# Patient Record
Sex: FEMALE | Race: WHITE | Employment: FULL TIME | URBAN - METROPOLITAN AREA
[De-identification: names, ages, dates, MRNs, and addresses within clinical notes are randomized per-mention and may not be internally consistent; named-entity substitution may affect disease eponyms.]

---

## 2018-01-09 ENCOUNTER — HOSPITAL ENCOUNTER (OUTPATIENT)
Dept: MAMMOGRAPHY | Age: 63
Discharge: HOME | End: 2018-01-09
Admitting: ADVANCED PRACTICE MIDWIFE

## 2018-01-09 DIAGNOSIS — Z12.39 BREAST CANCER SCREENING: ICD-10-CM

## 2018-03-20 ENCOUNTER — PAT TELEPHONE (OUTPATIENT)
Dept: PREADMISSION TESTING | Age: 63
End: 2018-03-20

## 2018-03-20 VITALS — BODY MASS INDEX: 29.88 KG/M2 | HEIGHT: 64 IN | WEIGHT: 175 LBS

## 2018-03-27 ENCOUNTER — HOSPITAL ENCOUNTER (OUTPATIENT)
Dept: ENDOSCOPY | Age: 63
Discharge: HOME | End: 2018-03-27
Attending: INTERNAL MEDICINE | Admitting: INTERNAL MEDICINE

## 2018-03-27 VITALS
SYSTOLIC BLOOD PRESSURE: 138 MMHG | HEART RATE: 60 BPM | RESPIRATION RATE: 16 BRPM | DIASTOLIC BLOOD PRESSURE: 87 MMHG | HEIGHT: 64 IN | BODY MASS INDEX: 29.88 KG/M2 | OXYGEN SATURATION: 97 % | TEMPERATURE: 98.6 F | WEIGHT: 175 LBS

## 2018-03-27 DIAGNOSIS — Z12.11 ENCOUNTER FOR SCREENING FOR MALIGNANT NEOPLASM OF COLON: ICD-10-CM

## 2018-03-27 DIAGNOSIS — Z12.11 SCREENING FOR COLON CANCER: ICD-10-CM

## 2018-03-27 RX ORDER — SODIUM CHLORIDE 0.9 % (FLUSH) 0.9 %
10 SYRINGE (ML) INJECTION PRN
Status: DISCONTINUED | OUTPATIENT
Start: 2018-03-27 | End: 2018-03-28 | Stop reason: HOSPADM

## 2018-03-27 RX ORDER — SODIUM CHLORIDE 0.9 % (FLUSH) 0.9 %
10 SYRINGE (ML) INJECTION EVERY 12 HOURS SCHEDULED
Status: DISCONTINUED | OUTPATIENT
Start: 2018-03-27 | End: 2018-03-28 | Stop reason: HOSPADM

## 2018-03-27 RX ORDER — SODIUM CHLORIDE 9 MG/ML
INJECTION, SOLUTION INTRAVENOUS CONTINUOUS
Status: DISCONTINUED | OUTPATIENT
Start: 2018-03-27 | End: 2018-03-28 | Stop reason: HOSPADM

## 2018-03-27 RX ADMIN — SODIUM CHLORIDE: 9 INJECTION, SOLUTION INTRAVENOUS at 12:04

## 2018-03-27 ASSESSMENT — PAIN - FUNCTIONAL ASSESSMENT: PAIN_FUNCTIONAL_ASSESSMENT: 0-10

## 2018-03-27 NOTE — ANESTHESIA POST-OP
Postoperative Anesthesia Note    Name:    Reina Go  MRN:      [id number]    Patient Vitals for the past 12 hrs:   BP Temp Temp src Pulse Resp SpO2 Height Weight   03/27/18 1310 138/87 - - 60 16 - - -   03/27/18 1300 129/85 - - 66 16 - - -   03/27/18 1252 (!) 106/54 98.6 °F (37 °C) Temporal 65 16 97 % - -   03/27/18 1201 (!) 147/79 97.5 °F (36.4 °C) Temporal 65 18 100 % 5' 4\" (1.626 m) 175 lb (79.4 kg)        LABS:    CBC  No results found for: WBC, HGB, HCT, PLT  RENAL  No results found for: NA, K, CL, CO2, BUN, CREATININE, GLUCOSE  COAGS  No results found for: PROTIME, INR, APTT    Intake & Output: In: 500 [I.V.:500]  Out: -     Nausea & Vomiting:  No    Level of Consciousness:  Awake    Pain Assessment:  Adequate analgesia    Anesthesia Complications:  No apparent anesthetic complications    SUMMARY      Vital signs stable  OK to discharge from Stage I post anesthesia care.   Care transferred from Anesthesiology department on discharge from perioperative area

## 2018-03-27 NOTE — OP NOTE
descending, sigmoid colon, and rectum. Careful circumferential examination of the mucosa in these areas demonstrated a 4mm transverse colon polyp. This was completely removed with biopsy polypectomy. There was no residual polyp or significant bleeding at the polypectomy site. The scope was then withdrawn into the rectum and retroflexed. The retroflexed view of the anal verge and rectum demonstrates no abnormalities. The scope was straightened, the colon was decompressed and the scope was withdrawn from the patient. The patient tolerated the procedure well and was taken to the PACU in good condition. Estimated blood loss: None    Impression:    1) 4mm transverse colon polyp. This was completely removed with biopsy polypectomy. There was no residual polyp or significant bleeding at the polypectomy site. Recommendations:    1) The patient had biopsies taken today. The patient should call for results in 7 days if they have not heard from our office. Our number is [phone].  2) The patient will need a recall colonoscopy in 5 years if the polyp is adenomatous (which have a small chance of transitioning into colon cancer), or 10 years if the polyp is hyperplastic (which have no chance of transitioning to colon cancer).        [facility], [address] [facility]  3/27/2018  [phone]

## 2018-03-27 NOTE — PROGRESS NOTES
Discharge instructions discussed with pt. And her . Understanding verbalized. Dr. Oniel Mallory also here to talk with them.

## 2018-04-26 PROBLEM — Z12.11 SCREENING FOR COLON CANCER: Status: RESOLVED | Noted: 2018-03-27 | Resolved: 2018-04-26

## 2019-04-30 ENCOUNTER — HOSPITAL ENCOUNTER (EMERGENCY)
Age: 64
Discharge: HOME | End: 2019-05-01
Attending: EMERGENCY MEDICINE
Payer: COMMERCIAL

## 2019-04-30 ENCOUNTER — APPOINTMENT (OUTPATIENT)
Dept: GENERAL RADIOLOGY | Age: 64
End: 2019-04-30
Payer: COMMERCIAL

## 2019-04-30 DIAGNOSIS — R07.9 CHEST PAIN, UNSPECIFIED TYPE: Primary | ICD-10-CM

## 2019-04-30 LAB
ANION GAP SERPL CALCULATED.3IONS-SCNC: 15 MMOL/L (ref 3–16)
BASOPHILS ABSOLUTE: 0 K/UL (ref 0–0.2)
BASOPHILS RELATIVE PERCENT: 0.5 %
BUN BLDV-MCNC: 15 MG/DL (ref 7–20)
CALCIUM SERPL-MCNC: 9.5 MG/DL (ref 8.3–10.6)
CHLORIDE BLD-SCNC: 103 MMOL/L (ref 99–110)
CO2: 23 MMOL/L (ref 21–32)
CREAT SERPL-MCNC: 0.7 MG/DL (ref 0.6–1.2)
EOSINOPHILS ABSOLUTE: 0.3 K/UL (ref 0–0.6)
EOSINOPHILS RELATIVE PERCENT: 3.7 %
GFR AFRICAN AMERICAN: >60
GFR NON-AFRICAN AMERICAN: >60
GLUCOSE BLD-MCNC: 97 MG/DL (ref 70–99)
HCT VFR BLD CALC: 44.9 % (ref 36–48)
HEMOGLOBIN: 15.1 G/DL (ref 12–16)
LYMPHOCYTES ABSOLUTE: 2.8 K/UL (ref 1–5.1)
LYMPHOCYTES RELATIVE PERCENT: 41 %
MCH RBC QN AUTO: 30 PG (ref 26–34)
MCHC RBC AUTO-ENTMCNC: 33.6 G/DL (ref 31–36)
MCV RBC AUTO: 89.3 FL (ref 80–100)
MONOCYTES ABSOLUTE: 0.6 K/UL (ref 0–1.3)
MONOCYTES RELATIVE PERCENT: 8.8 %
NEUTROPHILS ABSOLUTE: 3.2 K/UL (ref 1.7–7.7)
NEUTROPHILS RELATIVE PERCENT: 46 %
PDW BLD-RTO: 14.8 % (ref 12.4–15.4)
PLATELET # BLD: 219 K/UL (ref 135–450)
PMV BLD AUTO: 7.8 FL (ref 5–10.5)
POTASSIUM REFLEX MAGNESIUM: 3.9 MMOL/L (ref 3.5–5.1)
PRO-BNP: 84 PG/ML (ref 0–124)
RBC # BLD: 5.03 M/UL (ref 4–5.2)
SODIUM BLD-SCNC: 141 MMOL/L (ref 136–145)
TROPONIN: <0.01 NG/ML
WBC # BLD: 6.9 K/UL (ref 4–11)

## 2019-04-30 PROCEDURE — 80048 BASIC METABOLIC PNL TOTAL CA: CPT

## 2019-04-30 PROCEDURE — 2580000003 HC RX 258: Performed by: PHYSICIAN ASSISTANT

## 2019-04-30 PROCEDURE — 93005 ELECTROCARDIOGRAM TRACING: CPT | Performed by: PHYSICIAN ASSISTANT

## 2019-04-30 PROCEDURE — 99285 EMERGENCY DEPT VISIT HI MDM: CPT

## 2019-04-30 PROCEDURE — 83880 ASSAY OF NATRIURETIC PEPTIDE: CPT

## 2019-04-30 PROCEDURE — 71046 X-RAY EXAM CHEST 2 VIEWS: CPT

## 2019-04-30 PROCEDURE — 84484 ASSAY OF TROPONIN QUANT: CPT

## 2019-04-30 PROCEDURE — 85025 COMPLETE CBC W/AUTO DIFF WBC: CPT

## 2019-04-30 PROCEDURE — 6370000000 HC RX 637 (ALT 250 FOR IP): Performed by: PHYSICIAN ASSISTANT

## 2019-04-30 RX ORDER — ASPIRIN 325 MG
325 TABLET ORAL DAILY
Status: DISCONTINUED | OUTPATIENT
Start: 2019-04-30 | End: 2019-05-01 | Stop reason: HOSPADM

## 2019-04-30 RX ORDER — SODIUM CHLORIDE 0.9 % (FLUSH) 0.9 %
10 SYRINGE (ML) INJECTION EVERY 12 HOURS SCHEDULED
Status: DISCONTINUED | OUTPATIENT
Start: 2019-04-30 | End: 2019-05-01 | Stop reason: HOSPADM

## 2019-04-30 RX ORDER — SODIUM CHLORIDE 0.9 % (FLUSH) 0.9 %
10 SYRINGE (ML) INJECTION PRN
Status: DISCONTINUED | OUTPATIENT
Start: 2019-04-30 | End: 2019-05-01 | Stop reason: HOSPADM

## 2019-04-30 RX ADMIN — ASPIRIN 325 MG ORAL TABLET 325 MG: 325 PILL ORAL at 21:57

## 2019-04-30 RX ADMIN — Medication 10 ML: at 22:13

## 2019-04-30 ASSESSMENT — PAIN SCALES - GENERAL: PAINLEVEL_OUTOF10: 6

## 2019-04-30 ASSESSMENT — ENCOUNTER SYMPTOMS
VOMITING: 0
WHEEZING: 0
SORE THROAT: 0
ABDOMINAL PAIN: 0
SHORTNESS OF BREATH: 0
COUGH: 0
NAUSEA: 0

## 2019-04-30 ASSESSMENT — PAIN DESCRIPTION - LOCATION: LOCATION: CHEST

## 2019-04-30 ASSESSMENT — PAIN DESCRIPTION - PAIN TYPE: TYPE: ACUTE PAIN;CHRONIC PAIN

## 2019-04-30 ASSESSMENT — HEART SCORE: ECG: 0

## 2019-04-30 NOTE — ED PROVIDER NOTES
ED Attending Attestation Note     Date of evaluation: 4/30/2019    This patient was seen by the advance practice provider. I have seen and examined the patient, agree with the workup, evaluation, management and diagnosis. The care plan has been discussed. I have reviewed the ECG and concur with the JEANNE's interpretation. My assessment reveals s1s2. Respiratory effort symmetrical.  Cardiac workup ordered.      Bonita Stokes MD  04/30/19 3759

## 2019-04-30 NOTE — ED PROVIDER NOTES
[address] [facility] ENCOUNTER          PHYSICIAN ASSISTANT NOTE       Date of evaluation: 4/30/2019    Chief Complaint     Chest Pain      History of Present Illness     Maria Del Carmen Miller is a 59 y.o. female presents today with acute onset chest pain. Patient states she woke this morning with some burning on her sternum, which progressive worsen or sharp pressure-like pain that radiated down towards her left breast.  The chest pain seemed to worsen when she went all long walk today. Denies any shortness of breath or chest pain. She denies any back pain or tearing back pain. She denies any abdominal pain. She denies any nausea or vomiting, sweating, presyncopal, or other symptoms. She denies any cardiac history. She denies any history of aortic pathology. She states she is more stress lately because her  just underwent open-heart surgery 2 weeks ago and is still recovering. She states she may have been treated for high blood pressure in the past but is not currently on medications for this, resents today at 184/121. Review of Systems     Review of Systems   Constitutional: Negative for chills and fever. HENT: Negative for sore throat. Eyes: Negative for visual disturbance. Respiratory: Negative for cough, shortness of breath and wheezing. Cardiovascular: Positive for chest pain. Negative for palpitations. Gastrointestinal: Negative for abdominal pain, nausea and vomiting. Musculoskeletal: Negative for gait problem. Skin: Negative for rash. Neurological: Negative for dizziness and headaches. Past Medical, Surgical, Family, and Social History     She has a past medical history of Seasonal allergies. She has a past surgical history that includes Tubal ligation; Breast surgery; Tonsillectomy; Foot surgery (Right); [place] tooth extraction; and Foot surgery (Left, 05/27/2015).   Her family history includes Diabetes in her father; Heart Disease in her father EKG    RADIOLOGY:  XR CHEST STANDARD (2 VW)   Final Result      Possible bronchitis      No lobar consolidation. NM Cardiac Stress Test Nuclear Imaging    (Results Pending)       LABS:   Results for orders placed or performed during the hospital encounter of 04/30/19   CBC Auto Differential   Result Value Ref Range    WBC 6.9 4.0 - 11.0 K/uL    RBC 5.03 4.00 - 5.20 M/uL    Hemoglobin 15.1 12.0 - 16.0 g/dL    Hematocrit 44.9 36.0 - 48.0 %    MCV 89.3 80.0 - 100.0 fL    MCH 30.0 26.0 - 34.0 pg    MCHC 33.6 31.0 - 36.0 g/dL    RDW 14.8 12.4 - 15.4 %    Platelets 115 704 - 804 K/uL    MPV 7.8 5.0 - 10.5 fL    Neutrophils % 46.0 %    Lymphocytes % 41.0 %    Monocytes % 8.8 %    Eosinophils % 3.7 %    Basophils % 0.5 %    Neutrophils # 3.2 1.7 - 7.7 K/uL    Lymphocytes # 2.8 1.0 - 5.1 K/uL    Monocytes # 0.6 0.0 - 1.3 K/uL    Eosinophils # 0.3 0.0 - 0.6 K/uL    Basophils # 0.0 0.0 - 0.2 K/uL   Basic Metabolic Panel w/ Reflex to MG   Result Value Ref Range    Sodium 141 136 - 145 mmol/L    Potassium reflex Magnesium 3.9 3.5 - 5.1 mmol/L    Chloride 103 99 - 110 mmol/L    CO2 23 21 - 32 mmol/L    Anion Gap 15 3 - 16    Glucose 97 70 - 99 mg/dL    BUN 15 7 - 20 mg/dL    CREATININE 0.7 0.6 - 1.2 mg/dL    GFR Non-African American >60 >60    GFR African American >60 >60    Calcium 9.5 8.3 - 10.6 mg/dL   Troponin   Result Value Ref Range    Troponin <0.01 <0.01 ng/mL   Brain Natriuretic Peptide   Result Value Ref Range    Pro-BNP 84 0 - 124 pg/mL       ED BEDSIDE ULTRASOUND:      RECENT VITALS:  BP: (!) 184/121, Temp: 98.4 °F (36.9 °C), Pulse: 64, Resp: 11, SpO2: 99 %     Procedures         ED Course     Nursing Notes, Past Medical Hx, Past Surgical Hx, Social Hx, Allergies, and Family Hx were reviewed.     The patient was given the following medications:  Orders Placed This Encounter   Medications    sodium chloride flush 0.9 % injection 10 mL    sodium chloride flush 0.9 % injection 10 mL    aspirin tablet 325 mg

## 2019-05-01 VITALS
DIASTOLIC BLOOD PRESSURE: 68 MMHG | HEART RATE: 69 BPM | OXYGEN SATURATION: 99 % | TEMPERATURE: 98.4 F | RESPIRATION RATE: 16 BRPM | SYSTOLIC BLOOD PRESSURE: 127 MMHG

## 2019-05-01 LAB
EKG ATRIAL RATE: 62 BPM
EKG DIAGNOSIS: NORMAL
EKG P AXIS: 46 DEGREES
EKG P-R INTERVAL: 194 MS
EKG Q-T INTERVAL: 436 MS
EKG QRS DURATION: 88 MS
EKG QTC CALCULATION (BAZETT): 442 MS
EKG R AXIS: 51 DEGREES
EKG T AXIS: 70 DEGREES
EKG VENTRICULAR RATE: 62 BPM
LV EF: 81 %
LVEF MODALITY: NORMAL
TROPONIN: <0.01 NG/ML
TROPONIN: <0.01 NG/ML

## 2019-05-01 PROCEDURE — 2580000003 HC RX 258: Performed by: PHYSICIAN ASSISTANT

## 2019-05-01 PROCEDURE — 78452 HT MUSCLE IMAGE SPECT MULT: CPT

## 2019-05-01 PROCEDURE — 3430000000 HC RX DIAGNOSTIC RADIOPHARMACEUTICAL: Performed by: EMERGENCY MEDICINE

## 2019-05-01 PROCEDURE — A9502 TC99M TETROFOSMIN: HCPCS | Performed by: EMERGENCY MEDICINE

## 2019-05-01 PROCEDURE — 84484 ASSAY OF TROPONIN QUANT: CPT

## 2019-05-01 PROCEDURE — 93017 CV STRESS TEST TRACING ONLY: CPT

## 2019-05-01 RX ADMIN — Medication 10 ML: at 09:15

## 2019-05-01 RX ADMIN — TETROFOSMIN 10 MILLICURIE: 1.38 INJECTION, POWDER, LYOPHILIZED, FOR SOLUTION INTRAVENOUS at 07:54

## 2019-05-01 RX ADMIN — Medication 10 ML: at 10:18

## 2019-05-01 RX ADMIN — TETROFOSMIN 30 MILLICURIE: 1.38 INJECTION, POWDER, LYOPHILIZED, FOR SOLUTION INTRAVENOUS at 09:14

## 2019-05-01 RX ADMIN — Medication 10 ML: at 07:55

## 2019-05-01 NOTE — ED PROVIDER NOTES
[facility] Emergency Department  ED Observation Progress Note   Emergency Physicians          DiagnosticEvaluation      Chest pain       Assessment      Leonides Butt continues to be managed in accordance with the CDU clinical guidelines for chest pain. An update of her clinical problem list includes:    - no further episodes of chest pain       Plan      - serial troponins  - stress test in AM         Subjective      Leonides Butt has been admitted to the CDU for 7 hours, 45 minutes. Serial assessments of her clinical progress include:    - No further episodes of chest pain       Physical Examination      Physical Exam   Constitutional: She appears well-developed and well-nourished. No distress. Cardiovascular: Normal rate, regular rhythm and normal heart sounds. Exam reveals no gallop and no friction rub. No murmur heard. Pulmonary/Chest: Effort normal and breath sounds normal. She has no wheezes. She has no rales. Nursing note and vitals reviewed.               Ewell Kussmaul, MD  05/01/19 4739

## 2019-05-01 NOTE — ED NOTES
Pt back from stress lab. Pt denies any CP or SOB. No other needs at this time will continue to monitor.       Wayne Martinez RN  05/01/19 0528

## 2019-05-01 NOTE — ED NOTES
[facility] Emergency Department  EDObservation Admission Note   Emergency Physicians       Time Placed in ED Observation: DISPOSITION Ed Observation 04/30/2019 08:41:13 PM    Impression and Plan      In summary, Samara Sahni is admitted by to the [facility] for chest pain. Dr. Dannielle Ellsworth is the CDU admission attending. This patient has been risk-stratified based on the available history, physical exam, and related study findings. Admission toobservation status for further diagnosis/treatment/monitoring of chest pain is warranted clinically. This extendedperiod of observation is specifically required to determine the need for hospitalization. We will observe the patient for the following endpoints:    - Stress test  - Disposition    When met, appropriate disposition will be arranged. Diagnostic Evaluation      Diagnostic studies and ED interventions germane to this period of clinical observation willinclude:    - Nuclear exercise stress test       Consultant(s)      None       Patient History      Samara Sahni is a 59 y.o. female who presented to the Emergency Department for evaluation of chest pain. The acute evaluation included a negative troponin ×2, clear chest x-ray, nonischemic EKG, unremarkable remainder laboratory work. Upon admission to the Clinical Decision Unit, Samara Sahni resting comfortably, chest pain has resolved, took 81 mg baby aspirin prior to presentation in the emergency room. .      Past Medical History  Past Medical History:   Diagnosis Date    Seasonal allergies      Past Surgical History:   Procedure Laterality Date    BREAST SURGERY      FOOT SURGERY Right     FOOT SURGERY Left 05/27/2015    1ST METATARSAL OSTEOTOMY LEFT, PIPJ ARTHROPLASTY, 2ND   3RD    TONSILLECTOMY      TUBAL LIGATION      WISDOM TOOTH EXTRACTION       Family History   Problem Relation Age of Onset    Heart Disease Mother     Heart Disease Father  Diabetes Father      Social History     Tobacco Use    Smoking status: Never Smoker    Smokeless tobacco: Never Used   Substance Use Topics    Alcohol use: Yes     Alcohol/week: 1.2 oz     Types: 2 Glasses of wine per week    Drug use: No        Medications      Previous Medications    FLAXSEED, LINSEED, (FLAX SEED OIL PO)    Take by mouth daily    LORATADINE (CLARITIN) 10 MG TABLET    Take 10 mg by mouth daily    MULTIPLE VITAMINS-MINERALS (THERAPEUTIC MULTIVITAMIN-MINERALS) TABLET    Take 1 tablet by mouth daily          Review of Systems      Review of Systems   Constitutional: Negative for chills and fever. HENT: Negative for sore throat. Eyes: Negative for visual disturbance. Respiratory: Negative for cough, shortness of breath and wheezing. Cardiovascular: Positive for chest pain. Negative for palpitations. Gastrointestinal: Negative for abdominal pain, nausea and vomiting. Musculoskeletal: Negative for gait problem. Skin: Negative for rash. Neurological: Negative for dizziness and headaches. Physical Examination      Physical Exam    General: Well appearing, well nourished, in no apparent state of distress.     HEENT:  Normocephalic, atraumatic. Pupils equal, sclera white. Handling secretions without difficulty.     Neck: No meningismus. Trachea midline     Pulmonary: Respirations even. Non labored. No tachypnea. Good air movement throughout     Cardiac: Chest symmetrical and non-tender on palpation of chest wall. RRR. no M/R/G.      Abdomen:  Non-distended. Non rigid and non tender to palpation.      Musculoskeletal:  Ambulates under own control. Atraumatic exam with no focal swelling or tenderness. No peripheral edema. Strength 5/5 in all four extremities.     Neuro:  Alert and oriented x 3. CN II - XII grossly intact.  Moves all extremities spontaneously.     Vascular:  2+ peripheral pulses in bilateral upper and lower extremities       Skin:  Warm and well perfused without rashes or lesions     Psych:  Appropriate mood and affect           Earmelanie Lombardo PA-C  04/30/19 2051

## 2019-05-01 NOTE — ED PROVIDER NOTES
[facility] Emergency Department  ED Observation Disposition Note   Emergency Physicians         Diagnostic Evaluation      Diagnostic studies germane to this period of clinical observation include:    - Negative serial troponins  - Nuclear medicine stress test which reveals normal isotope uptake at stress and rest without evidence of myocardial ischemia or scar. Consultant(s) Final Recommendations      - NA     Impression and Plan      Paolo Pardo has been cared for according to the standard [facility] observation protocol for chest pain. This extended period of observation was specifically requiredto determine the need for hospitalization. Prior to discharge from observation, the final physical exam is documented above. Significant events during the course of observation based on the goals of the clinicalproblem list include:    - Continued stable and normal vital signs.  - Negative serial troponins  - Normal myocardial perfusion study with overall findings representing a low risk scan. Based on the patient's condition and test results, the plan is to Discharge to home    Thetotal length of observation was 14 hours. Dr. Torie Mercado is the CDU disposition attending. The patient will follow-up with:    - Her PCP, Dr. Kelly Ravi    As appropriate, please see the AVS for comprehensive discharge instructions. Subjective      Cyndy Tarango comprehensive diagnostic evaluation and therapeutic management in accordance with the CDU guidelines for Chest pain.  Based on her clinical response and diagnostic information obtained during this period of observation, the disposition is Discharge to home     Physical Examination      Physical Exam   BP: 127/68, Pulse: 69, Temp: 98.4 °F (36.9 °C), Resp: 16, SpO2: 99 %  Awake, alert, pleasant and conversational  Breathing comfortably  Moving all extremities equally  Skin warm and dry, without diaphoresis  Normal mood and affect    Patient denies any chest discomfort     Melvin Rodriguez MD  05/02/19 0790

## 2021-07-01 ENCOUNTER — APPOINTMENT (RX ONLY)
Dept: URBAN - METROPOLITAN AREA CLINIC 170 | Facility: CLINIC | Age: 66
Setting detail: DERMATOLOGY
End: 2021-07-01

## 2021-07-01 DIAGNOSIS — D18.0 HEMANGIOMA: ICD-10-CM

## 2021-07-01 DIAGNOSIS — L57.0 ACTINIC KERATOSIS: ICD-10-CM

## 2021-07-01 DIAGNOSIS — L81.4 OTHER MELANIN HYPERPIGMENTATION: ICD-10-CM

## 2021-07-01 DIAGNOSIS — D22 MELANOCYTIC NEVI: ICD-10-CM

## 2021-07-01 DIAGNOSIS — L82.1 OTHER SEBORRHEIC KERATOSIS: ICD-10-CM

## 2021-07-01 PROBLEM — D22.5 MELANOCYTIC NEVI OF TRUNK: Status: ACTIVE | Noted: 2021-07-01

## 2021-07-01 PROBLEM — D18.01 HEMANGIOMA OF SKIN AND SUBCUTANEOUS TISSUE: Status: ACTIVE | Noted: 2021-07-01

## 2021-07-01 PROCEDURE — 99204 OFFICE O/P NEW MOD 45 MIN: CPT

## 2021-07-01 PROCEDURE — ? FULL BODY SKIN EXAM

## 2021-07-01 PROCEDURE — ? TREATMENT REGIMEN

## 2021-07-01 PROCEDURE — ? PRESCRIPTION MEDICATION MANAGEMENT

## 2021-07-01 PROCEDURE — ? ADDITIONAL NOTES

## 2021-07-01 PROCEDURE — ? COUNSELING

## 2021-07-01 PROCEDURE — ? PRESCRIPTION

## 2021-07-01 RX ORDER — TIRBANIBULIN 10 MG/G
OINTMENT TOPICAL
Qty: 1 | Refills: 0 | Status: ERX

## 2021-07-01 ASSESSMENT — LOCATION DETAILED DESCRIPTION DERM
LOCATION DETAILED: MIDDLE STERNUM
LOCATION DETAILED: NASAL DORSUM
LOCATION DETAILED: LEFT MEDIAL BREAST 10-11:00 REGION
LOCATION DETAILED: STERNAL NOTCH
LOCATION DETAILED: RIGHT LATERAL SUPERIOR CHEST

## 2021-07-01 ASSESSMENT — LOCATION ZONE DERM
LOCATION ZONE: NOSE
LOCATION ZONE: TRUNK

## 2021-07-01 ASSESSMENT — LOCATION SIMPLE DESCRIPTION DERM
LOCATION SIMPLE: NOSE
LOCATION SIMPLE: LEFT BREAST
LOCATION SIMPLE: CHEST

## 2021-07-01 NOTE — HPI: EVALUATION OF SKIN LESION(S)
What Type Of Note Output Would You Prefer (Optional)?: Bullet Format
Hpi Title: Evaluation of Skin Lesions
How Severe Are Your Spot(S)?: mild
Have Your Spot(S) Been Treated In The Past?: has been treated
Additional History: Her previous Dermatologist resigned, he froze spot on nose before with liquid nitrogen and a Q-tip

## 2021-08-04 ENCOUNTER — APPOINTMENT (RX ONLY)
Dept: URBAN - METROPOLITAN AREA CLINIC 170 | Facility: CLINIC | Age: 66
Setting detail: DERMATOLOGY
End: 2021-08-04

## 2021-08-04 DIAGNOSIS — L57.0 ACTINIC KERATOSIS: ICD-10-CM | Status: RESOLVED

## 2021-08-04 PROCEDURE — ? COUNSELING

## 2021-08-04 PROCEDURE — 99213 OFFICE O/P EST LOW 20 MIN: CPT | Mod: 25

## 2021-08-04 PROCEDURE — ? LIQUID NITROGEN

## 2021-08-04 PROCEDURE — 17000 DESTRUCT PREMALG LESION: CPT

## 2021-08-04 PROCEDURE — ? ADDITIONAL NOTES

## 2021-08-04 ASSESSMENT — LOCATION ZONE DERM: LOCATION ZONE: NOSE

## 2021-08-04 ASSESSMENT — LOCATION SIMPLE DESCRIPTION DERM: LOCATION SIMPLE: NOSE

## 2021-08-04 ASSESSMENT — LOCATION DETAILED DESCRIPTION DERM: LOCATION DETAILED: NASAL SUPRATIP

## 2022-01-31 ENCOUNTER — APPOINTMENT (RX ONLY)
Dept: URBAN - METROPOLITAN AREA CLINIC 170 | Facility: CLINIC | Age: 67
Setting detail: DERMATOLOGY
End: 2022-01-31

## 2022-01-31 DIAGNOSIS — L57.0 ACTINIC KERATOSIS: ICD-10-CM | Status: RESOLVED

## 2022-01-31 PROCEDURE — ? ADDITIONAL NOTES

## 2022-01-31 PROCEDURE — 17003 DESTRUCT PREMALG LES 2-14: CPT

## 2022-01-31 PROCEDURE — ? COUNSELING

## 2022-01-31 PROCEDURE — 99213 OFFICE O/P EST LOW 20 MIN: CPT | Mod: 25

## 2022-01-31 PROCEDURE — ? LIQUID NITROGEN

## 2022-01-31 PROCEDURE — 17000 DESTRUCT PREMALG LESION: CPT

## 2022-01-31 ASSESSMENT — LOCATION DETAILED DESCRIPTION DERM
LOCATION DETAILED: NASAL SUPRATIP
LOCATION DETAILED: NASAL INFRATIP

## 2022-01-31 ASSESSMENT — LOCATION SIMPLE DESCRIPTION DERM: LOCATION SIMPLE: NOSE

## 2022-01-31 ASSESSMENT — LOCATION ZONE DERM: LOCATION ZONE: NOSE

## 2022-01-31 NOTE — PROCEDURE: LIQUID NITROGEN
Render Post-Care Instructions In Note?: no
Show Aperture Variable?: Yes
Post-Care Instructions: I reviewed with the patient in detail post-care instructions. Patient is to wear sunprotection, and avoid picking at any of the treated lesions. Pt may apply Vaseline to crusted or scabbing areas.
Detail Level: Detailed
Consent: The patient's consent was obtained including but not limited to risks of crusting, scabbing, blistering, scarring, darker or lighter pigmentary change, recurrence, incomplete removal and infection.
Duration Of Freeze Thaw-Cycle (Seconds): 0

## 2022-06-12 NOTE — ANESTHESIA PRE-OP
BP Readings from Last 3 Encounters:   05/27/15 145/84       NPO Status:                                                                                 BMI:   Wt Readings from Last 3 Encounters:   03/20/18 175 lb (79.4 kg)   05/27/15 160 lb (72.6 kg)   05/22/15 160 lb (72.6 kg)     There is no height or weight on file to calculate BMI. Anesthesia Evaluation  Patient summary reviewed and Nursing notes reviewed no history of anesthetic complications:   Airway: Mallampati: III  TM distance: >3 FB   Neck ROM: full  Mouth opening: > = 3 FB Dental:          Pulmonary:Negative Pulmonary ROS and normal exam                               Cardiovascular:Negative CV ROS                   ROS comment: No chest pain. > 4 mets. No anticoagulation. Neuro/Psych:   Negative Neuro/Psych ROS              GI/Hepatic/Renal: Neg GI/Hepatic/Renal ROS       (-) hiatal hernia and GERD       Endo/Other: Negative Endo/Other ROS                    Abdominal:           Vascular:                                   NPO > MN    Pre-Operative Diagnosis: SCREENING/ ANTHEM    65 y.o.   BMI:  Body mass index is 30.04 kg/m². Vitals:    03/27/18 1201   BP: (!) 147/79   Pulse: 65   Resp: 18   Temp: 97.5 °F (36.4 °C)   TempSrc: Temporal   SpO2: 100%   Weight: 175 lb (79.4 kg)   Height: 5' 4\" (1.626 m)       Allergies   Allergen Reactions    Sulfa Antibiotics Hives    Lipitor [Atorvastatin] Rash     And  muscles aches       Social History   Substance Use Topics    Smoking status: Never Smoker    Smokeless tobacco: Never Used    Alcohol use 1.2 oz/week     2 Glasses of wine per week       LABS:    CBC  No results found for: WBC, HGB, HCT, PLT  RENAL  No results found for: NA, K, CL, CO2, BUN, CREATININE, GLUCOSE  COAGS  No results found for: PROTIME, INR, APTT           Anesthesia Plan      MAC     ASA 1     (I discussed with the patient the risks and benefits of PIV, MAC, IV Narcotics, PACU.   All questions were answered the patient agrees with the plan)        Anesthetic plan and risks discussed with patient. Plan discussed with CRNA.                 Valentino Lamprey, MD   3/27/2018 ambulatory

## 2022-08-22 ENCOUNTER — APPOINTMENT (RX ONLY)
Dept: URBAN - METROPOLITAN AREA CLINIC 170 | Facility: CLINIC | Age: 67
Setting detail: DERMATOLOGY
End: 2022-08-22

## 2022-08-22 DIAGNOSIS — L57.0 ACTINIC KERATOSIS: ICD-10-CM

## 2022-08-22 DIAGNOSIS — L81.4 OTHER MELANIN HYPERPIGMENTATION: ICD-10-CM

## 2022-08-22 DIAGNOSIS — L82.1 OTHER SEBORRHEIC KERATOSIS: ICD-10-CM

## 2022-08-22 DIAGNOSIS — D18.0 HEMANGIOMA: ICD-10-CM

## 2022-08-22 DIAGNOSIS — D22 MELANOCYTIC NEVI: ICD-10-CM

## 2022-08-22 PROBLEM — D22.5 MELANOCYTIC NEVI OF TRUNK: Status: ACTIVE | Noted: 2022-08-22

## 2022-08-22 PROBLEM — D18.01 HEMANGIOMA OF SKIN AND SUBCUTANEOUS TISSUE: Status: ACTIVE | Noted: 2022-08-22

## 2022-08-22 PROCEDURE — ? ADDITIONAL NOTES

## 2022-08-22 PROCEDURE — ? LIQUID NITROGEN

## 2022-08-22 PROCEDURE — ? TREATMENT REGIMEN

## 2022-08-22 PROCEDURE — 17000 DESTRUCT PREMALG LESION: CPT

## 2022-08-22 PROCEDURE — 17003 DESTRUCT PREMALG LES 2-14: CPT

## 2022-08-22 PROCEDURE — ? COUNSELING

## 2022-08-22 PROCEDURE — ? FULL BODY SKIN EXAM

## 2022-08-22 PROCEDURE — 99213 OFFICE O/P EST LOW 20 MIN: CPT | Mod: 25

## 2022-08-22 ASSESSMENT — LOCATION DETAILED DESCRIPTION DERM
LOCATION DETAILED: LEFT SUPERIOR UPPER BACK
LOCATION DETAILED: RIGHT POSTERIOR SHOULDER
LOCATION DETAILED: RIGHT LATERAL SUPERIOR CHEST
LOCATION DETAILED: LEFT MEDIAL BREAST 10-11:00 REGION
LOCATION DETAILED: MIDDLE STERNUM
LOCATION DETAILED: NASAL DORSUM
LOCATION DETAILED: STERNAL NOTCH

## 2022-08-22 ASSESSMENT — LOCATION SIMPLE DESCRIPTION DERM
LOCATION SIMPLE: CHEST
LOCATION SIMPLE: NOSE
LOCATION SIMPLE: LEFT UPPER BACK
LOCATION SIMPLE: RIGHT SHOULDER
LOCATION SIMPLE: LEFT BREAST

## 2022-08-22 ASSESSMENT — LOCATION ZONE DERM
LOCATION ZONE: NOSE
LOCATION ZONE: TRUNK
LOCATION ZONE: ARM

## 2022-08-22 NOTE — PROCEDURE: MIPS QUALITY
Quality 431: Preventive Care And Screening: Unhealthy Alcohol Use - Screening: Patient screened for unhealthy alcohol use using a single question and scores less than 2 times per year
Quality 111:Pneumonia Vaccination Status For Older Adults: Pneumococcal vaccine was not administered on or after patient’s 60th birthday and before the end of the measurement period, reason not otherwise specified
Quality 226: Preventive Care And Screening: Tobacco Use: Screening And Cessation Intervention: Patient screened for tobacco use and is an ex/non-smoker
Quality 130: Documentation Of Current Medications In The Medical Record: Current Medications Documented
Quality 47: Advance Care Plan: Advance Care Planning discussed and documented in the medical record; patient did not wish or was not able to name a surrogate decision maker or provide an advance care plan.
Detail Level: Detailed
Quality 431: Preventive Care And Screening: Unhealthy Alcohol Use - Screening: Patient not identified as an unhealthy alcohol user when screened for unhealthy alcohol use using a systematic screening method

## 2023-01-08 ENCOUNTER — HOSPITAL ENCOUNTER (EMERGENCY)
Age: 68
Discharge: HOME | End: 2023-01-08
Attending: STUDENT IN AN ORGANIZED HEALTH CARE EDUCATION/TRAINING PROGRAM
Payer: COMMERCIAL

## 2023-01-08 ENCOUNTER — APPOINTMENT (OUTPATIENT)
Dept: GENERAL RADIOLOGY | Age: 68
End: 2023-01-08
Payer: COMMERCIAL

## 2023-01-08 VITALS
HEART RATE: 66 BPM | SYSTOLIC BLOOD PRESSURE: 186 MMHG | RESPIRATION RATE: 18 BRPM | DIASTOLIC BLOOD PRESSURE: 94 MMHG | OXYGEN SATURATION: 95 % | TEMPERATURE: 97.9 F

## 2023-01-08 DIAGNOSIS — I10 UNCONTROLLED HYPERTENSION: ICD-10-CM

## 2023-01-08 DIAGNOSIS — I16.0 HYPERTENSIVE URGENCY: Primary | ICD-10-CM

## 2023-01-08 LAB
ANION GAP SERPL CALCULATED.3IONS-SCNC: 11 MMOL/L (ref 3–16)
BASOPHILS ABSOLUTE: 0.1 K/UL (ref 0–0.2)
BASOPHILS RELATIVE PERCENT: 0.9 %
BUN BLDV-MCNC: 18 MG/DL (ref 7–20)
CALCIUM SERPL-MCNC: 9.5 MG/DL (ref 8.3–10.6)
CHLORIDE BLD-SCNC: 102 MMOL/L (ref 99–110)
CO2: 23 MMOL/L (ref 21–32)
CREAT SERPL-MCNC: 0.7 MG/DL (ref 0.6–1.2)
EOSINOPHILS ABSOLUTE: 0.3 K/UL (ref 0–0.6)
EOSINOPHILS RELATIVE PERCENT: 2.8 %
GFR SERPL CREATININE-BSD FRML MDRD: >60 ML/MIN/{1.73_M2}
GLUCOSE BLD-MCNC: 96 MG/DL (ref 70–99)
HCT VFR BLD CALC: 46 % (ref 36–48)
HEMOGLOBIN: 15.5 G/DL (ref 12–16)
LYMPHOCYTES ABSOLUTE: 4.1 K/UL (ref 1–5.1)
LYMPHOCYTES RELATIVE PERCENT: 44 %
MCH RBC QN AUTO: 29.4 PG (ref 26–34)
MCHC RBC AUTO-ENTMCNC: 33.7 G/DL (ref 31–36)
MCV RBC AUTO: 87.3 FL (ref 80–100)
MONOCYTES ABSOLUTE: 0.8 K/UL (ref 0–1.3)
MONOCYTES RELATIVE PERCENT: 8.9 %
NEUTROPHILS ABSOLUTE: 4 K/UL (ref 1.7–7.7)
NEUTROPHILS RELATIVE PERCENT: 43.4 %
PDW BLD-RTO: 14.7 % (ref 12.4–15.4)
PLATELET # BLD: 231 K/UL (ref 135–450)
PMV BLD AUTO: 7.4 FL (ref 5–10.5)
POTASSIUM REFLEX MAGNESIUM: 4.2 MMOL/L (ref 3.5–5.1)
PRO-BNP: 59 PG/ML (ref 0–124)
RBC # BLD: 5.26 M/UL (ref 4–5.2)
SODIUM BLD-SCNC: 136 MMOL/L (ref 136–145)
TROPONIN: <0.01 NG/ML
WBC # BLD: 9.3 K/UL (ref 4–11)

## 2023-01-08 PROCEDURE — 84484 ASSAY OF TROPONIN QUANT: CPT

## 2023-01-08 PROCEDURE — 85025 COMPLETE CBC W/AUTO DIFF WBC: CPT

## 2023-01-08 PROCEDURE — 71045 X-RAY EXAM CHEST 1 VIEW: CPT

## 2023-01-08 PROCEDURE — 80048 BASIC METABOLIC PNL TOTAL CA: CPT

## 2023-01-08 PROCEDURE — 93005 ELECTROCARDIOGRAM TRACING: CPT | Performed by: STUDENT IN AN ORGANIZED HEALTH CARE EDUCATION/TRAINING PROGRAM

## 2023-01-08 PROCEDURE — 6370000000 HC RX 637 (ALT 250 FOR IP): Performed by: STUDENT IN AN ORGANIZED HEALTH CARE EDUCATION/TRAINING PROGRAM

## 2023-01-08 PROCEDURE — 36415 COLL VENOUS BLD VENIPUNCTURE: CPT

## 2023-01-08 PROCEDURE — 99285 EMERGENCY DEPT VISIT HI MDM: CPT

## 2023-01-08 PROCEDURE — 83880 ASSAY OF NATRIURETIC PEPTIDE: CPT

## 2023-01-08 RX ORDER — HYDROCHLOROTHIAZIDE 12.5 MG/1
12.5 CAPSULE, GELATIN COATED ORAL DAILY
Qty: 30 CAPSULE | Refills: 0 | Status: SHIPPED | OUTPATIENT
Start: 2023-01-08 | End: 2023-01-12

## 2023-01-08 RX ORDER — ACETAMINOPHEN 325 MG/1
650 TABLET ORAL ONCE
Status: COMPLETED | OUTPATIENT
Start: 2023-01-08 | End: 2023-01-08

## 2023-01-08 RX ADMIN — ACETAMINOPHEN 650 MG: 325 TABLET ORAL at 22:53

## 2023-01-08 ASSESSMENT — PAIN DESCRIPTION - ORIENTATION: ORIENTATION: RIGHT;LEFT

## 2023-01-08 ASSESSMENT — PAIN SCALES - GENERAL: PAINLEVEL_OUTOF10: 4

## 2023-01-08 ASSESSMENT — ENCOUNTER SYMPTOMS: GASTROINTESTINAL NEGATIVE: 1

## 2023-01-08 ASSESSMENT — PAIN DESCRIPTION - LOCATION: LOCATION: HEAD

## 2023-01-08 ASSESSMENT — PAIN DESCRIPTION - DESCRIPTORS: DESCRIPTORS: POUNDING

## 2023-01-09 LAB
EKG ATRIAL RATE: 68 BPM
EKG DIAGNOSIS: NORMAL
EKG P AXIS: 45 DEGREES
EKG P-R INTERVAL: 200 MS
EKG Q-T INTERVAL: 424 MS
EKG QRS DURATION: 84 MS
EKG QTC CALCULATION (BAZETT): 450 MS
EKG R AXIS: 59 DEGREES
EKG T AXIS: 98 DEGREES
EKG VENTRICULAR RATE: 68 BPM

## 2023-01-09 NOTE — PROGRESS NOTES
[facility]   Electrophysiology Consultation   Date: 1/9/2023  Reason for Consultation: Palpitations   Consult Requesting Physician: Elizabeth Manjarrez MD     CC: ***   HPI: Katey Holbrook is a 78 y.o. female with a past medical history of palpitations and hypertension. 01/08/2023 Patient presented to [facility] ED with complaints of elevated blood pressures for the past two days with mild headache and palpitations day prior. BP was 189/107. ECG performed revealed normal sinus rhythm. Patient declined CT of head. Discharged on HCTZ 12.5 mg QD. Review of System:  [x] Full ROS obtained and negative except as mentioned in HPI or below      Prior to Admission medications    Medication Sig Start Date End Date Taking? Authorizing Provider   hydroCHLOROthiazide (MICROZIDE) 12.5 MG capsule Take 1 capsule by mouth daily 1/8/23 2/7/23  Ghassan Pimentel MD   loratadine (CLARITIN) 10 MG tablet Take 10 mg by mouth daily    Historical Provider, MD   Multiple Vitamins-Minerals (THERAPEUTIC MULTIVITAMIN-MINERALS) tablet Take 1 tablet by mouth daily    Historical Provider, MD   Flaxseed, Linseed, (FLAX SEED OIL PO) Take by mouth daily    Historical Provider, MD       Past Medical History:   Diagnosis Date    Seasonal allergies         Past Surgical History:   Procedure Laterality Date    BREAST SURGERY      FOOT SURGERY Right     FOOT SURGERY Left 05/27/2015    1ST METATARSAL OSTEOTOMY LEFT, PIPJ ARTHROPLASTY, 2ND   3RD    TONSILLECTOMY      TUBAL LIGATION      WISDOM TOOTH EXTRACTION         Allergies   Allergen Reactions    Sulfa Antibiotics Hives    Lipitor [Atorvastatin] Rash     And  muscles aches       Social History:  Reviewed. reports that she has never smoked. She has never used smokeless tobacco. She reports current alcohol use of about 2.0 standard drinks per week. She reports that she does not use drugs. Family History:  Reviewed. No family history of SCD.         Physical Examination:  There were no vitals filed for this visit. Wt Readings from Last 3 Encounters:   03/27/18 175 lb (79.4 kg)   03/20/18 175 lb (79.4 kg)   05/27/15 160 lb (72.6 kg)         Labs:  Lab Results   Component Value Date     01/08/2023    K 4.2 01/08/2023    HGB 15.5 01/08/2023     01/08/2023    CREATININE 0.7 01/08/2023    BUN 18 01/08/2023     Imaging:  ECG 1/9/23  ***SR/AFIB, QTcH ***,QRS ***    Stress Test 05/01/2019   Summary    There is normal isotope uptake at stress and rest. There is no evidence of    myocardial ischemia or scar. Normal LV size and systolic function. Left ventricular ejection fraction of 81 %, likely mildly overestimated due    to small LV cavity. There are no regional wall motion abnormalities. Normal myocardial perfusion study. Overall findings represent a low risk scan. I independently reviewed relevant and available cardiac diagnostic tests ECG, CXR, Echo, Stress test, Device interrogation, Holter, CT scan.     *** Outside medical records via Care everywhere reviewed and summarized in H&P above. Assessment/Plan:  Palpitations   - ECG today shows ***     HTN  - Controlled/Not well controlled***  - BP goal <130/80  - Home BP monitoring encouraged, printed information provided on how to accurately measure BP at home. - Counseled to follow a low salt diet to assure blood pressure remains controlled for cardiovascular risk factor modification.   - The patient is counseled to get regular exercise 3-5 times per week and maintain a healthy weight reduce cardiovascular risk factors. - on HCTZ 12.5 mg QD         NOTE: This report was transcribed using voice recognition software. Every effort was made to ensure accuracy, however, inadvertent computerized transcription errors may be present.      Jeanna Powell MD  [address]   Office: [phone]  Fax: [phone]

## 2023-01-09 NOTE — ED TRIAGE NOTES
Pt presents to the ED for HTN. Pt took her BP at home last night and was 190s/100s. Pt reports palpitations during these episodes. Pt took her husbands Metoprolol 12.5 last night and this am to control her symptoms. Pt presents now for continued maintenance d/t her BP not improving at this time. Pt denies CP, SOB or palpitations.

## 2023-01-09 NOTE — DISCHARGE INSTRUCTIONS
Please make an appointment with your primary care physician within 5 to 7 days of discharge from the emergency department. Please call to make an appointment to see a cardiologist for review. Mainly in the monitor for the palpitations that you are experiencing. Please be compliant with your medication. If at any point you are experiencing a very severe headache, facial asymmetry, focal weakness, Any signs of a stroke or severe chest pain; please present to the nearest emergency department.

## 2023-01-09 NOTE — ED PROVIDER NOTES
[address] [facility] [address] ENCOUNTER          EM RESIDENT NOTE       Date of evaluation: 1/8/2023    Chief Complaint     Hypertension      History of Present Illness     Srinath Maddox is a 78 y.o. female, PMHx: HTN (not on any medications) who presents to the ED from home c/o elevated blood pressures x 2 days. Reports recording home BP as high as . She took spouse's metoprolol of 12.5 mg. Noted to have palpitations 1 day prior whilst at [employer]. Denies palpitations currently. She admits to a mild 4/10 headache, frontal region no radiation. Denies any vision changes, focal weakness, chest pain, abdominal pain, N/V, diarrhea/constipation. MEDICAL DECISION MAKING / ASSESSMENT / PLAN     INITIAL VITALS: BP: (!) 189/107, Temp: 97.9 °F (36.6 °C), Heart Rate: 74, Resp: 18, SpO2: 98 %      Medical Decision Making  Srinath Maddox is a 78 y.o. female with past medical history of hypertension however she has not been on any meds for over 3 years and has not seen her primary care physician for the same amount of time. Prior to presentation she had already taken metoprolol 12.5 mg. Upon presentation to the ED vitals pertinent for elevated blood pressure 189/107. Patient is well-appearing and physical examination was nonrevealing. CVS, pulmonary and neurologically intact. Stat EKG with no ischemic changes. Due to the symptoms CBC, BMP, troponin was done which were all within normal limits. She had only complained of a mild generalized headache in the ED and was given Tylenol. Heart score 3 - low risk     Attending physician discussed a head CT for which patient refused. She was discharged to home be started on hydrochlorothiazide 12.5 mg and was advised to make an appointment to see her primary care physician within the next 3 to 5 days post ED discharge. She was also scheduled an appointment with a cardiologist for review due to the report of palpitations a day prior.   Patient currently with no palpitations in the ED. I also explained return precautions for which she voiced that she understood and was amenable to plan. Amount and/or Complexity of Data Reviewed  Labs: ordered. Decision-making details documented in ED Course. Radiology: ordered. ECG/medicine tests: ordered. Risk  OTC drugs. Prescription drug management. This patient was also evaluated by the attending physician, Dr. Viola Silva MD. All care plans werediscussed and agreed upon. Clinical Impression     1. Hypertensive urgency    2. Uncontrolled hypertension        Disposition     PATIENT REFERRED TO:  Caryn Tesfaye MD  [address] [place] [address]  [phone]    Schedule an appointment as soon as possible for a visit   [facility] ED discharge follow up    Benji Andres MD  [address] [facility]    Schedule an appointment as soon as possible for a visit   ED follow up. Needs review. May benefit from heart monitor    DISCHARGE MEDICATIONS:  Discharge Medication List as of 1/8/2023 11:33 PM        START taking these medications    Details   hydroCHLOROthiazide (MICROZIDE) 12.5 MG capsule Take 1 capsule by mouth daily, Disp-30 capsule, R-0Normal             DISPOSITION Decision To Discharge 01/08/2023 11:02:59 PM        Diagnostic Results and Other Data     RADIOLOGY:  XR CHEST PORTABLE   Final Result      No acute pulmonary disease.                       LABS:   Results for orders placed or performed during the hospital encounter of 66/80/20   Basic Metabolic Panel w/ Reflex to MG   Result Value Ref Range    Sodium 136 136 - 145 mmol/L    Potassium reflex Magnesium 4.2 3.5 - 5.1 mmol/L    Chloride 102 99 - 110 mmol/L    CO2 23 21 - 32 mmol/L    Anion Gap 11 3 - 16    Glucose 96 70 - 99 mg/dL    BUN 18 7 - 20 mg/dL    Creatinine 0.7 0.6 - 1.2 mg/dL    Est, Glom Filt Rate >60 >60    Calcium 9.5 8.3 - 10.6 mg/dL   CBC with Auto Differential Result Value Ref Range    WBC 9.3 4.0 - 11.0 K/uL    RBC 5.26 (H) 4.00 - 5.20 M/uL    Hemoglobin 15.5 12.0 - 16.0 g/dL    Hematocrit 46.0 36.0 - 48.0 %    MCV 87.3 80.0 - 100.0 fL    MCH 29.4 26.0 - 34.0 pg    MCHC 33.7 31.0 - 36.0 g/dL    RDW 14.7 12.4 - 15.4 %    Platelets 693 870 - 094 K/uL    MPV 7.4 5.0 - 10.5 fL    Neutrophils % 43.4 %    Lymphocytes % 44.0 %    Monocytes % 8.9 %    Eosinophils % 2.8 %    Basophils % 0.9 %    Neutrophils Absolute 4.0 1.7 - 7.7 K/uL    Lymphocytes Absolute 4.1 1.0 - 5.1 K/uL    Monocytes Absolute 0.8 0.0 - 1.3 K/uL    Eosinophils Absolute 0.3 0.0 - 0.6 K/uL    Basophils Absolute 0.1 0.0 - 0.2 K/uL   Troponin   Result Value Ref Range    Troponin <0.01 <0.01 ng/mL   Brain Natriuretic Peptide   Result Value Ref Range    Pro-BNP 59 0 - 124 pg/mL     EKG   Interpreted in conjunction with emergencydepartment physician No att. providers found  Rhythm: normal sinus   Rate: normal  Axis: normal  Ectopy: none  Conduction: normal  ST Segments: no acute change  T Waves:no acute change  Q Waves: none  Clinical Impression: no acute changes  Comparison:  compared to ECG in 2019      RECENT VITALS:  BP: (!) 186/94, Temp: 97.9 °F (36.6 °C), Heart Rate: 66,Resp: 18, SpO2: 95 %       ED Course     Nursing Notes, Past Medical Hx, Past Surgical Hx, Social Hx, Allergies, and Family Hx were reviewed. ED Course as of 01/09/23 002Berto Grey Jan 08, 2023 2230 Troponin: <0.01 [AS]   2230 WBC: 9.3 [AS]   2230 Creatinine: 0.7 [AS]      ED Course User Index  [AS] Dariel Watkins MD       The patient was given the following medications:  Orders Placed This Encounter   Medications    acetaminophen (TYLENOL) tablet 650 mg    hydroCHLOROthiazide (MICROZIDE) 12.5 MG capsule     Sig: Take 1 capsule by mouth daily     Dispense:  30 capsule     Refill:  0     CONSULTS:  None    Review of Systems     Review of Systems   Constitutional: Negative. HENT: Negative. Gastrointestinal: Negative. Genitourinary: Negative. Neurological:  Positive for headaches. Past Medical, Surgical, Family, and Social History     She has a past medical history of Seasonal allergies. She has a past surgical history that includes Tubal ligation; Breast surgery; Tonsillectomy; Foot surgery (Right); [place] tooth extraction; and Foot surgery (Left, 05/27/2015). Her family history includes Diabetes in her father; Heart Disease in her father and mother. She reports that she has never smoked. She has never used smokeless tobacco. She reports current alcohol use of about 2.0 standard drinks per week. She reports that she does not use drugs. Medications     Discharge Medication List as of 1/8/2023 11:33 PM        CONTINUE these medications which have NOT CHANGED    Details   loratadine (CLARITIN) 10 MG tablet Take 10 mg by mouth daily      Multiple Vitamins-Minerals (THERAPEUTIC MULTIVITAMIN-MINERALS) tablet Take 1 tablet by mouth daily      Flaxseed, Linseed, (FLAX SEED OIL PO) Take by mouth daily             Allergies     She is allergic to sulfa antibiotics and lipitor [atorvastatin]. Physical Exam     INITIAL VITALS: BP: (!) 189/107, Temp: 97.9 °F (36.6 °C), Heart Rate: 74, Resp: 18, SpO2: 98 %   Physical Exam  Constitutional:       Appearance: She is not ill-appearing. HENT:      Head: Normocephalic. Mouth/Throat:      Pharynx: Oropharynx is clear. Eyes:      Pupils: Pupils are equal, round, and reactive to light. Cardiovascular:      Rate and Rhythm: Normal rate and regular rhythm. Pulmonary:      Effort: Pulmonary effort is normal.      Breath sounds: Normal breath sounds. Abdominal:      General: Abdomen is flat. Musculoskeletal:      Right lower leg: No edema. Left lower leg: No edema. Skin:     General: Skin is warm. Capillary Refill: Capillary refill takes less than 2 seconds. Neurological:      General: No focal deficit present.       Mental Status: She is alert and oriented to person, place, and time.               Grace Palmer MD  Resident  01/09/23 1425

## 2023-01-09 NOTE — ED NOTES
Discharge instructions given per provider order. 1 Prescription(s) reviewed. Patient verbalized understanding.         Tony Cardenas RN  01/08/23 2516

## 2023-01-09 NOTE — ED PROVIDER NOTES
ED Attending Attestation Note     Date of evaluation: 1/8/2023    This patient was seen by the resident. I have seen and examined the patient, agree with the workup, evaluation, management and diagnosis. The care plan has been discussed. I have reviewed the ECG and concur with the resident's interpretation. My assessment reveals a woman with hypertension. Hypertension has been going on for at least 2 and half days. The headache that she reports began only this evening and is mild in severity. I did encourage the patient to undergo cross-sectional imaging of the head, but she declined to do so as she feels that her headache is likely related to not drinking caffeine today. She avoided caffeine because of the palpitations. I did discuss the possibility of atrial fibrillation with her. I encouraged her to follow-up with cardiology. She was referred to Dr. Bari Burroughs. This was a patient preference that she displayed with Dr. Bari Burroughs of from his excellent care of her . On exam, she is awake alert conversant. Lungs are clear to auscultation bilaterally. She has no murmur on auscultation of the heart. The rhythm is regular and the rate is normal.  Her abdomen is soft benign,  She is awake alert conversant  she has no alteration in her sensorium or mentation  There is no aphasia or dysarthria. Visual fields are full to confrontation bilaterally.    EOMI and without nystagmus         Pradip Yarbrough MD  01/09/23 1008

## 2023-01-12 ENCOUNTER — OFFICE VISIT (OUTPATIENT)
Dept: CARDIOLOGY CLINIC | Age: 68
End: 2023-01-12
Payer: COMMERCIAL

## 2023-01-12 VITALS
HEART RATE: 74 BPM | WEIGHT: 190 LBS | BODY MASS INDEX: 32.61 KG/M2 | DIASTOLIC BLOOD PRESSURE: 100 MMHG | SYSTOLIC BLOOD PRESSURE: 135 MMHG

## 2023-01-12 DIAGNOSIS — I10 HYPERTENSION, UNSPECIFIED TYPE: ICD-10-CM

## 2023-01-12 DIAGNOSIS — R00.2 PALPITATION: Primary | ICD-10-CM

## 2023-01-12 PROCEDURE — 99204 OFFICE O/P NEW MOD 45 MIN: CPT | Performed by: INTERNAL MEDICINE

## 2023-01-12 PROCEDURE — 3074F SYST BP LT 130 MM HG: CPT | Performed by: INTERNAL MEDICINE

## 2023-01-12 PROCEDURE — 3078F DIAST BP <80 MM HG: CPT | Performed by: INTERNAL MEDICINE

## 2023-01-12 PROCEDURE — 1123F ACP DISCUSS/DSCN MKR DOCD: CPT | Performed by: INTERNAL MEDICINE

## 2023-01-12 PROCEDURE — 93000 ELECTROCARDIOGRAM COMPLETE: CPT | Performed by: INTERNAL MEDICINE

## 2023-01-12 RX ORDER — OLMESARTAN MEDOXOMIL AND HYDROCHLOROTHIAZIDE 20/12.5 20; 12.5 MG/1; MG/1
1 TABLET ORAL DAILY
COMMUNITY

## 2023-01-12 NOTE — PROGRESS NOTES
[address]   Electrophysiology Consultation   Date: 1/12/2023  Reason for Consultation: Palpitations   Consult Requesting Physician: Ghada Bacon MD     CC: palpitations     HPI: Patricia Lyles is a 78 y.o. female with a past medical history of palpitations and hypertension. 01/08/2023 Patient presented to [facility] ED with complaints of elevated blood pressures for the past two days with mild headache and palpitations day prior. BP was 189/107. ECG revealed normal sinus rhythm. Patient declined CT of head. Discharged on HCTZ 12.5 mg QD. Today, she reports feeling occasional palpitations described as fluttering. However, she had an episode of rapid heart racing while at [employer] this past Sat, lasting about 10 min. She believes the onset was gradual and offset as well. She went home and checked her BP, which was elevated at 215/115. She took her 's Toprol and eventually went to the ED the following day. Denies complaints of dizziness, CP, SOB, orthopnea, presyncope, or syncope. Denies exercise intolerance. Review of System:  [x] Full ROS obtained and negative except as mentioned in HPI or below      Prior to Admission medications    Medication Sig Start Date End Date Taking?  Authorizing Provider   olmesartan-hydroCHLOROthiazide (BENICAR HCT) 20-12.5 MG per tablet Take 1 tablet by mouth daily   Yes Historical Provider, MD   loratadine (CLARITIN) 10 MG tablet Take 10 mg by mouth daily   Yes Historical Provider, MD   Multiple Vitamins-Minerals (THERAPEUTIC MULTIVITAMIN-MINERALS) tablet Take 1 tablet by mouth daily   Yes Historical Provider, MD   Flaxseed, Linseed, (FLAX SEED OIL PO) Take by mouth daily   Yes Historical Provider, MD       Past Medical History:   Diagnosis Date    Seasonal allergies         Past Surgical History:   Procedure Laterality Date    BREAST SURGERY      FOOT SURGERY Right     FOOT SURGERY Left 05/27/2015    1ST METATARSAL OSTEOTOMY LEFT, PIPJ ARTHROPLASTY, 2ND 3RD    TONSILLECTOMY      TUBAL LIGATION      WISDOM TOOTH EXTRACTION         Allergies   Allergen Reactions    Sulfa Antibiotics Hives    Lipitor [Atorvastatin] Rash     And  muscles aches       Social History:  Reviewed.  reports that she has never smoked. She has never used smokeless tobacco. She reports current alcohol use of about 2.0 standard drinks per week. She reports that she does not use drugs.     Family History:  Reviewed. No family history of SCD.        Physical Examination:  Vitals:    01/12/23 1400   BP: (!) 135/100   Pulse: 74      Wt Readings from Last 3 Encounters:   01/12/23 190 lb (86.2 kg)   03/27/18 175 lb (79.4 kg)   03/20/18 175 lb (79.4 kg)     No acute distress  Moist mucosa, nonicteric conjunctiva  Chest is clear, nonlabored, no rhonchi or wheeze  Heart is regular rate, regular rhythm, no murmurs, trace bilateral lower extremity swelling, extremities are warm and well-perfused  Abdomen is rounded, soft, nontender  Strength is grossly preserved, normal tone, normal gait  No focal neurologic deficits  Appropriate mood and affect  No rashes or ecchymoses    Labs:  Lab Results   Component Value Date     01/08/2023    K 4.2 01/08/2023    HGB 15.5 01/08/2023     01/08/2023    CREATININE 0.7 01/08/2023    BUN 18 01/08/2023     Imaging:  ECG 1/12/23  SR 75, QTcH 422,QRS 90    Stress Test 05/01/2019   Summary    There is normal isotope uptake at stress and rest. There is no evidence of    myocardial ischemia or scar.    Normal LV size and systolic function.    Left ventricular ejection fraction of 81 %, likely mildly overestimated due    to small LV cavity.    There are no regional wall motion abnormalities.    Normal myocardial perfusion study.    Overall findings represent a low risk scan.       I independently reviewed relevant and available cardiac diagnostic tests ECG, CXR, Echo, Stress test, Device interrogation, Holter, CT scan.     Outside medical records via Care everywhere  reviewed and summarized in H&P above. Assessment/Plan:    Palpitations/racing heart   - ECG today shows SR  - 30 day monitor    HTN  - Elevated today, 135/100  - Being managed by PCP  - on Benicar 12.5 mg QD   - BP goal <130/80  - Home BP monitoring    Follow up with me after the monitor in 1-2 months    I, Idalia Salazar RN, am scribing for and in the presence of Dr. Minal Carrion. 01/12/23 2:25 PM  Idalia Salazar RN      NOTE: This report was transcribed using voice recognition software. Every effort was made to ensure accuracy, however, inadvertent computerized transcription errors may be present.      Minal Carrion MD  [address]   Office: [phone]  Fax: [phone]

## 2023-01-16 ENCOUNTER — TELEPHONE (OUTPATIENT)
Dept: CARDIOLOGY CLINIC | Age: 68
End: 2023-01-16

## 2023-01-16 NOTE — TELEPHONE ENCOUNTER
Pt states she seen  on 1.12.23 and a 30 day Vital Connect was placed pt states she is having a mammogram done on the 28th of this month and would like to know if it would effect the cam please call [phone]

## 2023-01-16 NOTE — TELEPHONE ENCOUNTER
Explained to pt that she can temporarily remove the Vital Connect during the mammogram, leave sticky side up, and then reapply after the mammogram. Pt verbalized understanding.

## 2023-02-06 ENCOUNTER — APPOINTMENT (RX ONLY)
Dept: URBAN - METROPOLITAN AREA CLINIC 170 | Facility: CLINIC | Age: 68
Setting detail: DERMATOLOGY
End: 2023-02-06

## 2023-02-06 DIAGNOSIS — L57.8 OTHER SKIN CHANGES DUE TO CHRONIC EXPOSURE TO NONIONIZING RADIATION: ICD-10-CM

## 2023-02-06 DIAGNOSIS — Z87.2 PERSONAL HISTORY OF DISEASES OF THE SKIN AND SUBCUTANEOUS TISSUE: ICD-10-CM | Status: RESOLVED

## 2023-02-06 PROCEDURE — ? PRESCRIPTION

## 2023-02-06 PROCEDURE — 99213 OFFICE O/P EST LOW 20 MIN: CPT

## 2023-02-06 PROCEDURE — ? COUNSELING

## 2023-02-06 PROCEDURE — ? ADDITIONAL NOTES

## 2023-02-06 PROCEDURE — ? FULL BODY SKIN EXAM - DECLINED

## 2023-02-06 PROCEDURE — ? EDUCATIONAL RESOURCES PROVIDED

## 2023-02-06 RX ORDER — TRETIONIN 0.25 MG/G
CREAM TOPICAL
Qty: 20 | Refills: 3 | Status: ERX | COMMUNITY
Start: 2023-02-06

## 2023-02-06 RX ADMIN — TRETIONIN 1: 0.25 CREAM TOPICAL at 00:00

## 2023-02-06 ASSESSMENT — LOCATION SIMPLE DESCRIPTION DERM
LOCATION SIMPLE: LEFT CHEEK
LOCATION SIMPLE: RIGHT CHEEK

## 2023-02-06 ASSESSMENT — LOCATION DETAILED DESCRIPTION DERM
LOCATION DETAILED: RIGHT INFERIOR CENTRAL MALAR CHEEK
LOCATION DETAILED: LEFT INFERIOR CENTRAL MALAR CHEEK

## 2023-02-06 ASSESSMENT — LOCATION ZONE DERM: LOCATION ZONE: FACE

## 2023-02-06 NOTE — PROCEDURE: FULL BODY SKIN EXAM - DECLINED
Instructions: This plan will send the code FBSD to the PM system.  DO NOT or CHANGE the price.
Detail Level: Simple
Body Of Note (Please Add Your Own Text Here): Pt declined FBSE.
Price (Do Not Change): 0.00

## 2023-02-27 NOTE — PROGRESS NOTES
[facility]   Electrophysiology Follow up     Date: 2/27/2023  I had the privilege of visiting Tomas Vera in the office. CC: Palpitations      HPI: Tomas Vera is a 78 y.o. female with a past medical history of palpitations and hypertension. He has presented to the emergency department in January with elevated blood pressure, palpitations. No documented arrhythmias. Holter monitor placed 1/12/2023 which revealed short runs of SVT and NSVT with one episode of transient HB, 3.49 second ventricular asystole, no symptoms. Arnoldo Bradford presents to the office today for follow up. Has fluttering sensation that can last from seconds to minutes, symptoms. Denied having the longer episodes she previously experienced while wearing the monitor. Less symptoms now, denies dizziness, lightheadedness, syncope. With regard to transient HB, is not clear but does not remember having symptoms at that time. Review of System:  [x] Full ROS obtained and negative except as mentioned in HPI      Prior to Admission medications    Medication Sig Start Date End Date Taking?  Authorizing Provider   olmesartan-hydroCHLOROthiazide (BENICAR HCT) 20-12.5 MG per tablet Take 1 tablet by mouth daily    Historical Provider, MD   loratadine (CLARITIN) 10 MG tablet Take 10 mg by mouth daily    Historical Provider, MD   Multiple Vitamins-Minerals (THERAPEUTIC MULTIVITAMIN-MINERALS) tablet Take 1 tablet by mouth daily    Historical Provider, MD   Flaxseed, Linseed, (FLAX SEED OIL PO) Take by mouth daily    Historical Provider, MD       Past Medical History:   Diagnosis Date    Seasonal allergies         Past Surgical History:   Procedure Laterality Date    BREAST SURGERY      FOOT SURGERY Right     FOOT SURGERY Left 05/27/2015    1ST METATARSAL OSTEOTOMY LEFT, PIPJ ARTHROPLASTY, 2ND   3RD    TONSILLECTOMY      TUBAL LIGATION      WISDOM TOOTH EXTRACTION         Allergies   Allergen Reactions    Sulfa Antibiotics Hives    Lipitor [Atorvastatin] Rash     And  muscles aches       Social History:  Reviewed. reports that she has never smoked. She has never used smokeless tobacco. She reports current alcohol use of about 2.0 standard drinks per week. She reports that she does not use drugs. Family History:  Reviewed. No family history of SCD. Physical Examination:  Vitals:    03/08/23 1405   BP: (!) 140/80   Pulse: Wt Readings from Last 3 Encounters:   01/12/23 190 lb (86.2 kg)   03/27/18 175 lb (79.4 kg)   03/20/18 175 lb (79.4 kg)     No acute distress  Moist mucosa, nonicteric conjunctiva  Chest clear, nonlabored, no rhonchi or wheeze  Heart is regular rate, regular rhythm, no murmurs, no lower extremity swelling, no jugular venous distention  Abdomen is rounded, soft, nontender  Strength is grossly preserved, normal tone  No focal neurologic deficits  Appropriate mood and affect  No rashes or ecchymoses    Labs:  Lab Results   Component Value Date     01/08/2023    K 4.2 01/08/2023    HGB 15.5 01/08/2023     01/08/2023    CREATININE 0.7 01/08/2023    BUN 18 01/08/2023     Imaging:    ECG 3/8  Sinus rhythm, QTcH 407,QRS 84    28 day Vital Connect (01/12/23-02/06/23)  Overall SR, 15 SVT episodes (longest 9 beats), 2 NSVT (longest 6 beats), 1 pause noted (lasting 3.49 seconds) average HR 75 bpm () <0.01% PAC and 0.03% PVC burden     Stress Test 05/01/19   Summary    There is normal isotope uptake at stress and rest. There is no evidence of    myocardial ischemia or scar. Normal LV size and systolic function. Left ventricular ejection fraction of 81 %, likely mildly overestimated due    to small LV cavity. There are no regional wall motion abnormalities. Normal myocardial perfusion study. Overall findings represent a low risk scan.        Monitor result 2/22        Assessment/plan:    Palpitations / transient HB   - ECG today shows sinus rhythm   - Monitor revealed short SVT ad NSVT episodes with 1 episode of transient HB, lasting 3.49 seconds, this was not preceded by slowing of the atrial rate, concerning for infranodal disease (see above image)   - discussed findings of monitor, palpitation symptoms have improved, no sustained arrhythmias (though did not have prolonged/sustained symptoms while wearing monitor). With regard to transient HB we discussed possibility of infranodal disease, risks of recurrent episodes with more prolonged periods of ventricular asystole, risks of dizziness/lightheadedness or syncope. We discussed observation vs EPS with testing of infranodal conduction time. Study for SVT could be done at that time. Patient agrees to proceed. HTN  - Borderline: 140/80  - BP goal <130/80  - Home BP monitoring  - followed by PCP      Proceed with EPS for infranodal disease and assessment of suspected SVT    Scribe attestation: This note was scribed in the presence of Minal Carrion MD by Valarie Henao LPN    Physician Attestation: I, Dr. Minal Carrion, confirm that the scribe's documentation has been prepared under my direction and personally reviewed by me in its entirety. I also confirm that the note above accurately reflects all work, treatment, procedures, and medical decision making performed by me. NOTE: This report was transcribed using voice recognition software. Every effort was made to ensure accuracy, however, inadvertent computerized transcription errors may be present.      Minal Carrion MD  [address]   Office: [phone]  Fax: [phone]

## 2023-03-08 ENCOUNTER — OFFICE VISIT (OUTPATIENT)
Dept: CARDIOLOGY CLINIC | Age: 68
End: 2023-03-08

## 2023-03-08 VITALS
DIASTOLIC BLOOD PRESSURE: 80 MMHG | WEIGHT: 182.8 LBS | SYSTOLIC BLOOD PRESSURE: 140 MMHG | HEART RATE: 72 BPM | BODY MASS INDEX: 31.38 KG/M2

## 2023-03-08 DIAGNOSIS — R00.2 PALPITATION: Primary | ICD-10-CM

## 2023-03-08 RX ORDER — AMLODIPINE BESYLATE 10 MG/1
10 TABLET ORAL DAILY
COMMUNITY

## 2023-03-08 RX ORDER — CHLORAL HYDRATE 500 MG
CAPSULE ORAL DAILY
COMMUNITY

## 2023-03-08 NOTE — PATIENT INSTRUCTIONS
Our  will be contacting you with a date and time for your procedure    The morning of your EP Study you will need to check in at the registration desk in the main lobby. PRE-PROCEDURE INSTRUCTIONS -   -Do not eat or drink anything after midnight the night before.  -You will need to have a responsible adult to drive you home.  -Your nurse will provide you with discharge instructions.  -You may take all of your other medications with a sip of water. If you have any questions regarding the procedure itself or medications, please call [phone] and ask to speak to an EP nurse.

## 2023-03-08 NOTE — LETTER
[address]  EP Procedure Sheet    3/8/23  Ricardo Groves  1955  EP Procedures  [] Pacemaker implant (single/dual) [x] EP Study (SVT)   [] ICD implant (single/dual) [] Atrial flutter ablation (CAIT Y/N)   [] Biv implant ICD [] Tilt Table   [] Biv implant PPM [] Atrial fibrillation ablation (CAIT Yes)   [] Generator Change (PPM/ICD/BiV) [] SVT ablation   [] Lead revision (RV/LA/RA) (<1 month) [] PVC ablation     [] Lead extraction +/- upgrade (BiV/PPM/ICD) [] VT Ischemic/ non-ischemic   [] Loop implant/ removal [] VT RVOT   [] Cardioversion [] VT Left sided   [] CAIT [] AVN ablation   Equipment  [] [employer]  [] SLIM Mapping System   [] [place] [] [address]   [] [employer] [] CryoAblation   [] Biotronik [] Laser Lead Extraction   EP Procedures Scheduling Request  # hours Requested  []1 []2 [x]2-4 [] 4-6 Scheduled  Date:   Specific Day  Completed    Anesthesia [x]yes []no (MAC) F/u Date:   CT surgery backup []yes [x]no     Overnight stay      Performing MD []RMM []MXA   []MKW [x] CMV First vs repeat   []1st [] 2nd [] 3rd   Pre-Procedure Labs / Imaging  [] PT/INR [] Type & cross   [] CBC [] Units PRBC   [] BMP/Mg [] Units FFP   [] Venogram [] Cardiac CTA for Pulmonary vein mapping     RN INITIALS: KBLPN  Patient Instructions  Do not eat or drink after midnight the night prior to procedure  Dx:Pause  ICD-10 code: I45.5

## 2023-03-09 ENCOUNTER — TELEPHONE (OUTPATIENT)
Dept: CARDIOLOGY CLINIC | Age: 68
End: 2023-03-09

## 2023-03-09 NOTE — TELEPHONE ENCOUNTER
Spoke with the patient and got her scheduled for procedure. I didn't have much instructions to go over with her, gave the basics about check in, showering, rides, etc. Told her the nurse would follow up with full instructions. She verbalized understanding.      Procedure- EP Study (SVT)   Date: 3/22/2023  Arrival time: 10:30 am   Procedure time: 12:00 pm     Qgenda update / alerted cath lab [facility]

## 2023-03-10 NOTE — TELEPHONE ENCOUNTER
LVM for the patient to call me either this week/next. I moved her procedure to Friday 3/24, but she is still trying to decide if she wants to complete it.      EP study w/possible SVT ablation    Qgenda updated / alerted cath lab (Fax)

## 2023-03-10 NOTE — TELEPHONE ENCOUNTER
Our  will be contacting you with a date and time for your procedure    The morning of your ablation you will need to check in at the registration desk in the main lobby. PRE-PROCEDURE INSTRUCTIONS -   -Do not eat or drink anything after midnight the night before your ablation.  -You will need to have a responsible adult to drive you home.  -Your nurse will provide you with discharge instructions.  -You may take all of your other medications with a sip of water. You will be scheduled for a 6-8 week follow up with cardiology. This will be done prior to your discharge instructions. If you have any questions regarding the procedure itself or medications, please call [phone] and ask to speak to an EP nurse. Spoke with family member and was advised that patient is not home from work .  Will need to try later to review instructions

## 2023-03-10 NOTE — TELEPHONE ENCOUNTER
Spoke with the patient and advised her of the instructions . She voiced understanding.  Call complete

## 2023-04-17 ENCOUNTER — ANESTHESIA (OUTPATIENT)
Dept: CARDIAC CATH/INVASIVE PROCEDURES | Age: 68
End: 2023-04-17

## 2023-04-17 ENCOUNTER — HOSPITAL ENCOUNTER (OUTPATIENT)
Dept: CARDIAC CATH/INVASIVE PROCEDURES | Age: 68
Discharge: HOME | End: 2023-04-19
Payer: COMMERCIAL

## 2023-04-17 ENCOUNTER — ANESTHESIA EVENT (OUTPATIENT)
Dept: CARDIAC CATH/INVASIVE PROCEDURES | Age: 68
End: 2023-04-17

## 2023-04-17 VITALS — BODY MASS INDEX: 29.02 KG/M2 | TEMPERATURE: 97.6 F | HEIGHT: 64 IN | WEIGHT: 170 LBS

## 2023-04-17 LAB
ALBUMIN SERPL-MCNC: 4.4 G/DL (ref 3.4–5)
ALBUMIN/GLOB SERPL: 1.7 {RATIO} (ref 1.1–2.2)
ALP SERPL-CCNC: 99 U/L (ref 40–129)
ALT SERPL-CCNC: 20 U/L (ref 10–40)
ANION GAP SERPL CALCULATED.3IONS-SCNC: 11 MMOL/L (ref 3–16)
AST SERPL-CCNC: 21 U/L (ref 15–37)
BASE EXCESS BLDV CALC-SCNC: 1 MMOL/L (ref -3–3)
BILIRUB SERPL-MCNC: 0.3 MG/DL (ref 0–1)
BUN SERPL-MCNC: 22 MG/DL (ref 7–20)
CA-I BLD-SCNC: 1.26 MMOL/L (ref 1.12–1.32)
CALCIUM SERPL-MCNC: 9.6 MG/DL (ref 8.3–10.6)
CHLORIDE SERPL-SCNC: 103 MMOL/L (ref 99–110)
CO2 BLDV-SCNC: 28 MMOL/L
CO2 SERPL-SCNC: 25 MMOL/L (ref 21–32)
CREAT SERPL-MCNC: 0.8 MG/DL (ref 0.6–1.2)
DEPRECATED RDW RBC AUTO: 14.1 % (ref 12.4–15.4)
EKG ATRIAL RATE: 63 BPM
EKG DIAGNOSIS: NORMAL
EKG P AXIS: 24 DEGREES
EKG P-R INTERVAL: 196 MS
EKG Q-T INTERVAL: 434 MS
EKG QRS DURATION: 88 MS
EKG QTC CALCULATION (BAZETT): 444 MS
EKG R AXIS: 46 DEGREES
EKG T AXIS: 85 DEGREES
EKG VENTRICULAR RATE: 63 BPM
GFR SERPLBLD CREATININE-BSD FMLA CKD-EPI: >60 ML/MIN/{1.73_M2}
GLUCOSE BLD-MCNC: 110 MG/DL (ref 70–99)
GLUCOSE SERPL-MCNC: 110 MG/DL (ref 70–99)
HCO3 BLDV-SCNC: 26.1 MMOL/L (ref 23–29)
HCT VFR BLD AUTO: 44.3 % (ref 36–48)
HGB BLD-MCNC: 15.3 G/DL (ref 12–16)
INR BLD: 1.2 (ref 0.88–1.12)
INR PPP: 0.86 (ref 0.84–1.16)
MCH RBC QN AUTO: 30.8 PG (ref 26–34)
MCHC RBC AUTO-ENTMCNC: 34.4 G/DL (ref 31–36)
MCV RBC AUTO: 89.6 FL (ref 80–100)
PCO2 BLDV: 45.2 MM HG (ref 40–50)
PERFORMED ON: ABNORMAL
PH BLDV: 7.37 [PH] (ref 7.35–7.45)
PLATELET # BLD AUTO: 219 K/UL (ref 135–450)
PMV BLD AUTO: 7.6 FL (ref 5–10.5)
PO2 BLDV: 28 MM HG
POC CREATININE: 0.8 MG/DL (ref 0.6–1.2)
POC SAMPLE TYPE: ABNORMAL
POTASSIUM BLD-SCNC: 3.9 MMOL/L (ref 3.5–5.1)
POTASSIUM SERPL-SCNC: 4 MMOL/L (ref 3.5–5.1)
PROT SERPL-MCNC: 7 G/DL (ref 6.4–8.2)
PROTHROMBIN TIME: 11.7 SEC (ref 11.5–14.8)
RBC # BLD AUTO: 4.95 M/UL (ref 4–5.2)
SAO2 % BLDV: 51 %
SODIUM BLD-SCNC: 142 MMOL/L (ref 136–145)
SODIUM SERPL-SCNC: 139 MMOL/L (ref 136–145)
WBC # BLD AUTO: 7.9 K/UL (ref 4–11)

## 2023-04-17 PROCEDURE — 93799 UNLISTED CV SVC/PROCEDURE: CPT

## 2023-04-17 PROCEDURE — 6360000002 HC RX W HCPCS: Performed by: INTERNAL MEDICINE

## 2023-04-17 PROCEDURE — 93653 COMPRE EP EVAL TX SVT: CPT | Performed by: INTERNAL MEDICINE

## 2023-04-17 PROCEDURE — 82803 BLOOD GASES ANY COMBINATION: CPT

## 2023-04-17 PROCEDURE — 2500000003 HC RX 250 WO HCPCS: Performed by: NURSE ANESTHETIST, CERTIFIED REGISTERED

## 2023-04-17 PROCEDURE — 93613 INTRACARDIAC EPHYS 3D MAPG: CPT

## 2023-04-17 PROCEDURE — 93623 PRGRMD STIMJ&PACG IV RX NFS: CPT

## 2023-04-17 PROCEDURE — C1732 CATH, EP, DIAG/ABL, 3D/VECT: HCPCS

## 2023-04-17 PROCEDURE — 93621 COMP EP EVL L PAC&REC C SINS: CPT

## 2023-04-17 PROCEDURE — 93005 ELECTROCARDIOGRAM TRACING: CPT | Performed by: INTERNAL MEDICINE

## 2023-04-17 PROCEDURE — 2580000003 HC RX 258: Performed by: NURSE ANESTHETIST, CERTIFIED REGISTERED

## 2023-04-17 PROCEDURE — C1760 CLOSURE DEV, VASC: HCPCS

## 2023-04-17 PROCEDURE — 3700000000 HC ANESTHESIA ATTENDED CARE

## 2023-04-17 PROCEDURE — C1894 INTRO/SHEATH, NON-LASER: HCPCS

## 2023-04-17 PROCEDURE — 2709999900 HC NON-CHARGEABLE SUPPLY

## 2023-04-17 PROCEDURE — 80053 COMPREHEN METABOLIC PANEL: CPT

## 2023-04-17 PROCEDURE — 6360000002 HC RX W HCPCS: Performed by: NURSE ANESTHETIST, CERTIFIED REGISTERED

## 2023-04-17 PROCEDURE — 82947 ASSAY GLUCOSE BLOOD QUANT: CPT

## 2023-04-17 PROCEDURE — 82330 ASSAY OF CALCIUM: CPT

## 2023-04-17 PROCEDURE — 85027 COMPLETE CBC AUTOMATED: CPT

## 2023-04-17 PROCEDURE — 85610 PROTHROMBIN TIME: CPT

## 2023-04-17 PROCEDURE — 2500000003 HC RX 250 WO HCPCS

## 2023-04-17 PROCEDURE — 93620 COMP EP EVL R AT VEN PAC&REC: CPT

## 2023-04-17 PROCEDURE — 93010 ELECTROCARDIOGRAM REPORT: CPT | Performed by: INTERNAL MEDICINE

## 2023-04-17 PROCEDURE — 93623 PRGRMD STIMJ&PACG IV RX NFS: CPT | Performed by: INTERNAL MEDICINE

## 2023-04-17 PROCEDURE — 6360000002 HC RX W HCPCS

## 2023-04-17 PROCEDURE — 84295 ASSAY OF SERUM SODIUM: CPT

## 2023-04-17 PROCEDURE — 2580000003 HC RX 258: Performed by: INTERNAL MEDICINE

## 2023-04-17 PROCEDURE — 3700000001 HC ADD 15 MINUTES (ANESTHESIA)

## 2023-04-17 PROCEDURE — 82565 ASSAY OF CREATININE: CPT

## 2023-04-17 PROCEDURE — 84132 ASSAY OF SERUM POTASSIUM: CPT

## 2023-04-17 RX ORDER — ACETAMINOPHEN 325 MG/1
650 TABLET ORAL EVERY 4 HOURS PRN
Status: DISCONTINUED | OUTPATIENT
Start: 2023-04-17 | End: 2023-04-20 | Stop reason: HOSPADM

## 2023-04-17 RX ORDER — SODIUM CHLORIDE, SODIUM LACTATE, POTASSIUM CHLORIDE, CALCIUM CHLORIDE 600; 310; 30; 20 MG/100ML; MG/100ML; MG/100ML; MG/100ML
INJECTION, SOLUTION INTRAVENOUS CONTINUOUS PRN
Status: DISCONTINUED | OUTPATIENT
Start: 2023-04-17 | End: 2023-04-17 | Stop reason: SDUPTHER

## 2023-04-17 RX ORDER — LIDOCAINE HYDROCHLORIDE 20 MG/ML
INJECTION, SOLUTION EPIDURAL; INFILTRATION; INTRACAUDAL; PERINEURAL PRN
Status: DISCONTINUED | OUTPATIENT
Start: 2023-04-17 | End: 2023-04-17 | Stop reason: SDUPTHER

## 2023-04-17 RX ORDER — DIPHENHYDRAMINE HYDROCHLORIDE 50 MG/ML
12.5 INJECTION INTRAMUSCULAR; INTRAVENOUS
OUTPATIENT
Start: 2023-04-17 | End: 2023-04-18

## 2023-04-17 RX ORDER — PROPOFOL 10 MG/ML
INJECTION, EMULSION INTRAVENOUS CONTINUOUS PRN
Status: DISCONTINUED | OUTPATIENT
Start: 2023-04-17 | End: 2023-04-17 | Stop reason: SDUPTHER

## 2023-04-17 RX ORDER — METOCLOPRAMIDE HYDROCHLORIDE 5 MG/ML
10 INJECTION INTRAMUSCULAR; INTRAVENOUS
OUTPATIENT
Start: 2023-04-17 | End: 2023-04-18

## 2023-04-17 RX ORDER — SODIUM CHLORIDE 9 MG/ML
25 INJECTION, SOLUTION INTRAVENOUS PRN
OUTPATIENT
Start: 2023-04-17

## 2023-04-17 RX ORDER — SODIUM CHLORIDE 9 MG/ML
INJECTION, SOLUTION INTRAVENOUS PRN
Status: DISCONTINUED | OUTPATIENT
Start: 2023-04-17 | End: 2023-04-20 | Stop reason: HOSPADM

## 2023-04-17 RX ORDER — SODIUM CHLORIDE 0.9 % (FLUSH) 0.9 %
5-40 SYRINGE (ML) INJECTION PRN
OUTPATIENT
Start: 2023-04-17

## 2023-04-17 RX ORDER — PROPOFOL 10 MG/ML
INJECTION, EMULSION INTRAVENOUS PRN
Status: DISCONTINUED | OUTPATIENT
Start: 2023-04-17 | End: 2023-04-17 | Stop reason: SDUPTHER

## 2023-04-17 RX ORDER — MEPERIDINE HYDROCHLORIDE 25 MG/ML
12.5 INJECTION INTRAMUSCULAR; INTRAVENOUS; SUBCUTANEOUS EVERY 5 MIN PRN
OUTPATIENT
Start: 2023-04-17

## 2023-04-17 RX ORDER — LABETALOL HYDROCHLORIDE 5 MG/ML
10 INJECTION, SOLUTION INTRAVENOUS
OUTPATIENT
Start: 2023-04-17

## 2023-04-17 RX ORDER — SODIUM CHLORIDE 0.9 % (FLUSH) 0.9 %
5-40 SYRINGE (ML) INJECTION PRN
Status: DISCONTINUED | OUTPATIENT
Start: 2023-04-17 | End: 2023-04-20 | Stop reason: HOSPADM

## 2023-04-17 RX ORDER — SODIUM CHLORIDE 0.9 % (FLUSH) 0.9 %
5-40 SYRINGE (ML) INJECTION EVERY 12 HOURS SCHEDULED
OUTPATIENT
Start: 2023-04-17

## 2023-04-17 RX ORDER — HYDRALAZINE HYDROCHLORIDE 20 MG/ML
10 INJECTION INTRAMUSCULAR; INTRAVENOUS
OUTPATIENT
Start: 2023-04-17

## 2023-04-17 RX ORDER — SODIUM CHLORIDE 0.9 % (FLUSH) 0.9 %
5-40 SYRINGE (ML) INJECTION EVERY 12 HOURS SCHEDULED
Status: DISCONTINUED | OUTPATIENT
Start: 2023-04-17 | End: 2023-04-20 | Stop reason: HOSPADM

## 2023-04-17 RX ADMIN — PHENYLEPHRINE HYDROCHLORIDE 150 MCG: 10 INJECTION, SOLUTION INTRAMUSCULAR; INTRAVENOUS; SUBCUTANEOUS at 08:39

## 2023-04-17 RX ADMIN — PROPOFOL 50 MG: 10 INJECTION, EMULSION INTRAVENOUS at 08:07

## 2023-04-17 RX ADMIN — ISOPROTERENOL HYDROCHLORIDE 2 MCG/MIN: 0.2 INJECTION, SOLUTION INTRAMUSCULAR; INTRAVENOUS at 08:49

## 2023-04-17 RX ADMIN — PHENYLEPHRINE HYDROCHLORIDE 100 MCG: 10 INJECTION, SOLUTION INTRAMUSCULAR; INTRAVENOUS; SUBCUTANEOUS at 08:33

## 2023-04-17 RX ADMIN — LIDOCAINE HYDROCHLORIDE 50 MG: 20 INJECTION, SOLUTION EPIDURAL; INFILTRATION; INTRACAUDAL; PERINEURAL at 08:07

## 2023-04-17 RX ADMIN — PHENYLEPHRINE HYDROCHLORIDE 100 MCG: 10 INJECTION, SOLUTION INTRAMUSCULAR; INTRAVENOUS; SUBCUTANEOUS at 08:25

## 2023-04-17 RX ADMIN — SODIUM CHLORIDE, SODIUM LACTATE, POTASSIUM CHLORIDE, AND CALCIUM CHLORIDE: .6; .31; .03; .02 INJECTION, SOLUTION INTRAVENOUS at 07:57

## 2023-04-17 RX ADMIN — PROCAINAMIDE HYDROCHLORIDE 1000 MG: 500 INJECTION INTRAMUSCULAR; INTRAVENOUS at 09:31

## 2023-04-17 RX ADMIN — PROPOFOL 25 MG: 10 INJECTION, EMULSION INTRAVENOUS at 09:48

## 2023-04-17 RX ADMIN — PROPOFOL 25 MG: 10 INJECTION, EMULSION INTRAVENOUS at 09:46

## 2023-04-17 RX ADMIN — PROPOFOL 150 MCG/KG/MIN: 10 INJECTION, EMULSION INTRAVENOUS at 08:03

## 2023-04-17 NOTE — H&P
seconds of ventricular asystole that did not have appreciable slowing of the atrial lead suggestive of a vagally mediated process. This was discussed with patient with a preference that she undergo electrophysiology study for assessment of conduction as well as EP study for SVT/palpitations.     Temp 97.6 °F (36.4 °C) (Oral)   Ht 5' 4\" (1.626 m)   Wt 170 lb (77.1 kg)   BMI 29.18 kg/m²   NAD  Chest clear non labored  Heart regular rhythm, regular rate, no LE edema  Abd soft non tender  No focal neuro deficits  Appropriate mood and affect    Mallampati Airway Assessment:  II     ASA Classification:  Class 3 - A patient with severe systemic disease that limits activity but is not incapacitating     Sedation/Anesthesia Plan:  [address] MD [address]   Office: [phone]  Fax: [phone]

## 2023-04-17 NOTE — ANESTHESIA PRE PROCEDURE
Diagnosis Date    Seasonal allergies        Past Surgical History:        Procedure Laterality Date    BREAST SURGERY      FOOT SURGERY Right     FOOT SURGERY Left 05/27/2015    1ST METATARSAL OSTEOTOMY LEFT, PIPJ ARTHROPLASTY, 2ND   3RD    TONSILLECTOMY      TUBAL LIGATION      WISDOM TOOTH EXTRACTION         Social History:    Social History     Tobacco Use    Smoking status: Never    Smokeless tobacco: Never   Substance Use Topics    Alcohol use: Yes     Alcohol/week: 2.0 standard drinks     Types: 2 Glasses of wine per week                                Counseling given: Not Answered      Vital Signs (Current):   Vitals:    04/17/23 0713   Temp: 97.6 °F (36.4 °C)   TempSrc: Oral   Weight: 170 lb (77.1 kg)   Height: 5' 4\" (1.626 m)                                              BP Readings from Last 3 Encounters:   03/08/23 (!) 140/80   01/12/23 (!) 135/100   01/08/23 (!) 186/94       NPO Status:                                                                                 BMI:   Wt Readings from Last 3 Encounters:   04/17/23 170 lb (77.1 kg)   03/08/23 182 lb 12.8 oz (82.9 kg)   01/12/23 190 lb (86.2 kg)     Body mass index is 29.18 kg/m².     CBC:   Lab Results   Component Value Date/Time    WBC 7.9 04/17/2023 07:15 AM    RBC 4.95 04/17/2023 07:15 AM    HGB 15.3 04/17/2023 07:15 AM    HCT 44.3 04/17/2023 07:15 AM    MCV 89.6 04/17/2023 07:15 AM    RDW 14.1 04/17/2023 07:15 AM     04/17/2023 07:15 AM       CMP:   Lab Results   Component Value Date/Time     04/17/2023 07:15 AM    K 4.0 04/17/2023 07:15 AM    K 4.2 01/08/2023 09:31 PM     04/17/2023 07:15 AM    CO2 25 04/17/2023 07:15 AM    BUN 22 04/17/2023 07:15 AM    CREATININE 0.8 04/17/2023 07:15 AM    CREATININE 0.8 04/17/2023 07:05 AM    GFRAA >60 04/30/2019 06:07 PM    AGRATIO 1.7 04/17/2023 07:15 AM    LABGLOM >60 04/17/2023 07:15 AM    GLUCOSE 110 04/17/2023 07:15 AM    PROT 7.0 04/17/2023 07:15 AM    CALCIUM 9.6

## 2023-04-17 NOTE — DISCHARGE INSTRUCTIONS
at [phone] (between 7am- 5pm, Mon-Fri). After hours you should contact your physician. [facility]  Cardiovascular Special Procedures  General Discharge Instructions    PROCEDURE: ____________________________________________________    _x___ Ling Rodríguez may be drowsy or lightheaded after receiving sedation. DO NOT operate a vehicle (automobile, bicycle, motorcycle, machinery, or power tools), make any important decisions or sign any important/legal documents, or drink alcoholic beverages for the next 24 hours  _x___ We strongly suggest that a responsible adult be with you for the next 24 hours for your protection and safety  ____ If the intravenous catheter site is painful, apply warm wet compresses on the site until the soreness is relieved and elevate the arm above the heart. Call your physician if no improvement in 2 to 3 days    DIETARY INSTRUCTIONS:    ____ Drink extra fluids over the next 24 hours (If not contraindicated by illness or by                   physician order)  ____ Start with clear liquids and progress to normal diet as you feel like eating. If you experience nausea or repeated episodes of vomiting, which persist beyond 12-24 hours, notify your doctor        _x___ Resume your previous diet      MEDICATION INSTRUCTIONS:    ____ See Medication Reconciliation Sheet          Call: _________________________________     FOLLOW-UP APPOINTMENT    Follow up with NP in 1 week. Follow up with MD as directed. Belongings returned to patient and/or family: Yes. The Discharge Instructions have been explained to me. I understand and can verbalize these instructions.

## 2023-04-17 NOTE — PROCEDURES
micropuncture needle, under ultrasound guidance, was used to access the femoral vein. A micropuncture wire was introduced into the vein and the needle was removed. Over this a 5 Fr dilator was placed. The micropuncture wire and dilator were removed and exchanged for a long 0.035 inch J-wire. This was repeated twice additional times for a total of 3 wires in the right groin    Over the wires one short 8Fr, and two 6 Fr sheath used and were placed in the right femoral vein. The wires and dilators were removed. EP study    A decapolar catheter was advanced into the coronary sinus under fluoroscopy so the distal poles were in the coronary sinus for left atrial recording and mapping and along with atrial pacing. A octa polar catheter was placed along the HIS. A quadripolar catheter was placed within the RV and used for ventricular pacing. An electrophysiology study was performed, the results included in the table below. Pacing from the ventricle demonstrated a one-to-one VA conduction, decremental, with concentric atrial activation along the coronary sinus catheter. Incremental atrial pacing and atrial extrastimuli were performed from the coronary sinus catheter. SVT was not induced. Baseline HV interval was 50ms There was an AH jump. ECHO beats were not seen. Crossing over phenomenon with incremental atrial pacing was not seen. Patient was started on isoproterenol (titrated up to  3 mcg/ms targeting heart rate > 100 bpm) and incremental atrial pacing and atrial extra stimuli (S2 and S2, S3) were again performed. No sustained SVT was induced. Atrial ectopy was not seen. Electrolyte were again performed during washout phase of isoproterenol. Once heart rate had returned to baseline. 1G procainamide was infused over 10 minutes to assess for prolongation in the HV interval. Maximum HV interval was 65ms. Procedure was performed with 3D mapping system without using fluoroscopy. EBL less than 20 ml.

## 2023-04-17 NOTE — ANESTHESIA POSTPROCEDURE EVALUATION
Department of Anesthesiology  Postprocedure Note    Patient: Robi Back  MRN: [id number]  YOB: 1955  Date of evaluation: 4/17/2023      Procedure Summary     Date: 04/17/23 Room / Location: [facility] Cath Lab    Anesthesia Start: 0032 Anesthesia Stop: 6336    Procedure: ELECTROPHYSIOLOGY Diagnosis:     Scheduled Providers: Allen Fam MD; LAURE Em - CRNA Responsible Provider: Allen Fam MD    Anesthesia Type: MAC ASA Status: 2          Anesthesia Type: MAC    Nyla Phase I:      Nyla Phase II:        Anesthesia Post Evaluation    Patient location during evaluation: PACU  Patient participation: complete - patient participated  Level of consciousness: awake and alert  Pain score: 0  Airway patency: patent  Nausea & Vomiting: no nausea and no vomiting  Complications: no  Cardiovascular status: hemodynamically stable  Respiratory status: acceptable  Hydration status: euvolemic

## 2023-08-21 ENCOUNTER — APPOINTMENT (RX ONLY)
Dept: URBAN - METROPOLITAN AREA CLINIC 170 | Facility: CLINIC | Age: 68
Setting detail: DERMATOLOGY
End: 2023-08-21

## 2023-08-21 DIAGNOSIS — L57.8 OTHER SKIN CHANGES DUE TO CHRONIC EXPOSURE TO NONIONIZING RADIATION: ICD-10-CM

## 2023-08-21 DIAGNOSIS — L81.4 OTHER MELANIN HYPERPIGMENTATION: ICD-10-CM | Status: STABLE

## 2023-08-21 DIAGNOSIS — L82.1 OTHER SEBORRHEIC KERATOSIS: ICD-10-CM | Status: STABLE

## 2023-08-21 DIAGNOSIS — D18.0 HEMANGIOMA: ICD-10-CM | Status: STABLE

## 2023-08-21 DIAGNOSIS — L57.0 ACTINIC KERATOSIS: ICD-10-CM | Status: INADEQUATELY CONTROLLED

## 2023-08-21 DIAGNOSIS — D22 MELANOCYTIC NEVI: ICD-10-CM | Status: STABLE

## 2023-08-21 PROBLEM — D18.01 HEMANGIOMA OF SKIN AND SUBCUTANEOUS TISSUE: Status: ACTIVE | Noted: 2023-08-21

## 2023-08-21 PROBLEM — D23.71 OTHER BENIGN NEOPLASM OF SKIN OF RIGHT LOWER LIMB, INCLUDING HIP: Status: ACTIVE | Noted: 2023-08-21

## 2023-08-21 PROBLEM — D22.5 MELANOCYTIC NEVI OF TRUNK: Status: ACTIVE | Noted: 2023-08-21

## 2023-08-21 PROCEDURE — ? COUNSELING

## 2023-08-21 PROCEDURE — ? LIQUID NITROGEN

## 2023-08-21 PROCEDURE — ? TREATMENT REGIMEN

## 2023-08-21 PROCEDURE — 99213 OFFICE O/P EST LOW 20 MIN: CPT | Mod: 25

## 2023-08-21 PROCEDURE — 17003 DESTRUCT PREMALG LES 2-14: CPT

## 2023-08-21 PROCEDURE — ? FULL BODY SKIN EXAM

## 2023-08-21 PROCEDURE — ? PRESCRIPTION MEDICATION MANAGEMENT

## 2023-08-21 PROCEDURE — 17000 DESTRUCT PREMALG LESION: CPT

## 2023-08-21 ASSESSMENT — LOCATION SIMPLE DESCRIPTION DERM
LOCATION SIMPLE: NOSE
LOCATION SIMPLE: LEFT BREAST
LOCATION SIMPLE: CHEST
LOCATION SIMPLE: RIGHT CHEEK
LOCATION SIMPLE: LEFT CHEEK

## 2023-08-21 ASSESSMENT — LOCATION DETAILED DESCRIPTION DERM
LOCATION DETAILED: MIDDLE STERNUM
LOCATION DETAILED: LEFT INFERIOR CENTRAL MALAR CHEEK
LOCATION DETAILED: NASAL SUPRATIP
LOCATION DETAILED: RIGHT INFERIOR CENTRAL MALAR CHEEK
LOCATION DETAILED: RIGHT LATERAL SUPERIOR CHEST
LOCATION DETAILED: LEFT MEDIAL BREAST 10-11:00 REGION
LOCATION DETAILED: STERNAL NOTCH

## 2023-08-21 ASSESSMENT — LOCATION ZONE DERM
LOCATION ZONE: FACE
LOCATION ZONE: NOSE
LOCATION ZONE: TRUNK

## 2023-08-21 NOTE — PROCEDURE: PRESCRIPTION MEDICATION MANAGEMENT
Continue Regimen: Tretinoin cream QHS as tolerated. She will call for refills when needed
Render In Strict Bullet Format?: No
Detail Level: Zone

## 2023-08-21 NOTE — PROCEDURE: MIPS QUALITY
Quality 111:Pneumonia Vaccination Status For Older Adults: Patient did not receive any pneumococcal conjugate or polysaccharide vaccine on or after their 60th birthday and before the end of the measurement period
Quality 130: Documentation Of Current Medications In The Medical Record: Current Medications Documented
Detail Level: Detailed
Quality 110: Preventive Care And Screening: Influenza Immunization: Influenza Immunization previously received during influenza season
Quality 431: Preventive Care And Screening: Unhealthy Alcohol Use - Screening: Patient not identified as an unhealthy alcohol user when screened for unhealthy alcohol use using a systematic screening method
Quality 47: Advance Care Plan: Advance Care Planning discussed and documented in the medical record; patient did not wish or was not able to name a surrogate decision maker or provide an advance care plan.
Quality 226: Preventive Care And Screening: Tobacco Use: Screening And Cessation Intervention: Patient screened for tobacco use and is an ex/non-smoker

## 2023-08-21 NOTE — HPI: EVALUATION OF SKIN LESION(S)
What Type Of Note Output Would You Prefer (Optional)?: Bullet Format
Hpi Title: Evaluation of Skin Lesions
How Severe Are Your Spot(S)?: mild
Have Your Spot(S) Been Treated In The Past?: has been treated
Additional History: Patient would like for you to take a special look at her nose, she thinks it might need some freezing today

## 2023-08-21 NOTE — PROCEDURE: COUNSELING
Detail Level: Generalized
Sunscreen Recommendations: Discussed cosmetic removal pricing if pt opts to have shave removal.
Detail Level: Detailed
Detail Level: Zone

## 2023-10-30 ENCOUNTER — HOSPITAL ENCOUNTER (OUTPATIENT)
Dept: WOMENS IMAGING | Age: 68
Discharge: HOME | End: 2023-10-30
Payer: MEDICARE

## 2023-10-30 ENCOUNTER — HOSPITAL ENCOUNTER (OUTPATIENT)
Dept: VASCULAR LAB | Age: 68
Discharge: HOME | End: 2023-10-30
Payer: MEDICARE

## 2023-10-30 VITALS — BODY MASS INDEX: 29.02 KG/M2 | HEIGHT: 64 IN | WEIGHT: 170 LBS

## 2023-10-30 DIAGNOSIS — Z12.31 VISIT FOR SCREENING MAMMOGRAM: ICD-10-CM

## 2023-10-30 DIAGNOSIS — I70.1 ATHEROSCLEROSIS OF RENAL ARTERY (HCC): ICD-10-CM

## 2023-10-30 DIAGNOSIS — E78.5 HYPERLIPIDEMIA, UNSPECIFIED HYPERLIPIDEMIA TYPE: ICD-10-CM

## 2023-10-30 PROCEDURE — 77063 BREAST TOMOSYNTHESIS BI: CPT

## 2023-10-30 PROCEDURE — 93880 EXTRACRANIAL BILAT STUDY: CPT

## 2023-11-13 ENCOUNTER — APPOINTMENT (RX ONLY)
Dept: URBAN - METROPOLITAN AREA CLINIC 170 | Facility: CLINIC | Age: 68
Setting detail: DERMATOLOGY
End: 2023-11-13

## 2023-11-13 DIAGNOSIS — L57.0 ACTINIC KERATOSIS: ICD-10-CM

## 2023-11-13 DIAGNOSIS — D18.0 HEMANGIOMA: ICD-10-CM

## 2023-11-13 PROBLEM — D18.01 HEMANGIOMA OF SKIN AND SUBCUTANEOUS TISSUE: Status: ACTIVE | Noted: 2023-11-13

## 2023-11-13 PROCEDURE — ? COUNSELING

## 2023-11-13 PROCEDURE — 17003 DESTRUCT PREMALG LES 2-14: CPT

## 2023-11-13 PROCEDURE — 99212 OFFICE O/P EST SF 10 MIN: CPT | Mod: 25

## 2023-11-13 PROCEDURE — 17000 DESTRUCT PREMALG LESION: CPT

## 2023-11-13 PROCEDURE — ? LIQUID NITROGEN

## 2023-11-13 ASSESSMENT — LOCATION SIMPLE DESCRIPTION DERM
LOCATION SIMPLE: NOSE
LOCATION SIMPLE: ABDOMEN

## 2023-11-13 ASSESSMENT — LOCATION DETAILED DESCRIPTION DERM
LOCATION DETAILED: NASAL SUPRATIP
LOCATION DETAILED: RIGHT RIB CAGE

## 2023-11-13 ASSESSMENT — LOCATION ZONE DERM
LOCATION ZONE: TRUNK
LOCATION ZONE: NOSE

## 2023-11-13 NOTE — PROCEDURE: MIPS QUALITY
Quality 111:Pneumonia Vaccination Status For Older Adults: Patient received any pneumococcal conjugate or polysaccharide vaccine on or after their 60th birthday and before the end of the measurement period
Quality 130: Documentation Of Current Medications In The Medical Record: Current Medications Documented
Detail Level: Detailed
Quality 110: Preventive Care And Screening: Influenza Immunization: Influenza immunization was not ordered or administered, reason not given
Quality 431: Preventive Care And Screening: Unhealthy Alcohol Use - Screening: Patient not identified as an unhealthy alcohol user when screened for unhealthy alcohol use using a systematic screening method
Quality 47: Advance Care Plan: Advance Care Planning discussed and documented in the medical record; patient did not wish or was not able to name a surrogate decision maker or provide an advance care plan.
Quality 226: Preventive Care And Screening: Tobacco Use: Screening And Cessation Intervention: Patient screened for tobacco use and is an ex/non-smoker

## 2024-09-10 ENCOUNTER — APPOINTMENT (RX ONLY)
Dept: URBAN - METROPOLITAN AREA CLINIC 170 | Facility: CLINIC | Age: 69
Setting detail: DERMATOLOGY
End: 2024-09-10

## 2024-09-10 DIAGNOSIS — L81.4 OTHER MELANIN HYPERPIGMENTATION: ICD-10-CM | Status: STABLE

## 2024-09-10 DIAGNOSIS — L82.1 OTHER SEBORRHEIC KERATOSIS: ICD-10-CM | Status: STABLE

## 2024-09-10 DIAGNOSIS — L57.0 ACTINIC KERATOSIS: ICD-10-CM

## 2024-09-10 DIAGNOSIS — D22 MELANOCYTIC NEVI: ICD-10-CM | Status: STABLE

## 2024-09-10 DIAGNOSIS — D18.0 HEMANGIOMA: ICD-10-CM | Status: STABLE

## 2024-09-10 PROBLEM — D18.01 HEMANGIOMA OF SKIN AND SUBCUTANEOUS TISSUE: Status: ACTIVE | Noted: 2024-09-10

## 2024-09-10 PROBLEM — D22.5 MELANOCYTIC NEVI OF TRUNK: Status: ACTIVE | Noted: 2024-09-10

## 2024-09-10 PROCEDURE — ? MEDICARE ABN

## 2024-09-10 PROCEDURE — ? TREATMENT REGIMEN

## 2024-09-10 PROCEDURE — 99213 OFFICE O/P EST LOW 20 MIN: CPT | Mod: 25

## 2024-09-10 PROCEDURE — ? LIQUID NITROGEN

## 2024-09-10 PROCEDURE — 17000 DESTRUCT PREMALG LESION: CPT

## 2024-09-10 PROCEDURE — ? FULL BODY SKIN EXAM

## 2024-09-10 PROCEDURE — ? COUNSELING

## 2024-09-10 ASSESSMENT — LOCATION SIMPLE DESCRIPTION DERM
LOCATION SIMPLE: LEFT LOWER BACK
LOCATION SIMPLE: RIGHT UPPER BACK
LOCATION SIMPLE: ABDOMEN
LOCATION SIMPLE: NOSE

## 2024-09-10 ASSESSMENT — LOCATION DETAILED DESCRIPTION DERM
LOCATION DETAILED: RIGHT MID-UPPER BACK
LOCATION DETAILED: NASAL SUPRATIP
LOCATION DETAILED: EPIGASTRIC SKIN
LOCATION DETAILED: LEFT SUPERIOR MEDIAL MIDBACK
LOCATION DETAILED: PERIUMBILICAL SKIN

## 2024-09-10 ASSESSMENT — LOCATION ZONE DERM
LOCATION ZONE: TRUNK
LOCATION ZONE: NOSE

## 2024-09-10 NOTE — HPI: EVALUATION OF SKIN LESION(S)
What Type Of Note Output Would You Prefer (Optional)?: Bullet Format
Hpi Title: Evaluation of Skin Lesions
Additional History: Check nose on-off rough patch

## 2024-09-10 NOTE — PROCEDURE: LIQUID NITROGEN
Detail Level: Detailed
Render Post-Care Instructions In Note?: yes
Render Note In Bullet Format When Appropriate: No
Post-Care Instructions: I reviewed with the patient in detail post-care instructions. Patient is to wear sunprotection, and avoid picking at any of the treated lesions. Pt may apply Vaseline to crusted or scabbing areas.
Duration Of Freeze Thaw-Cycle (Seconds): 5
Number Of Freeze-Thaw Cycles: 1 freeze-thaw cycle
Consent: The patient's consent was obtained including but not limited to risks of crusting, scabbing, blistering, scarring, darker or lighter pigmentary change, recurrence, incomplete removal and infection.

## 2025-06-16 ENCOUNTER — HOSPITAL ENCOUNTER (OUTPATIENT)
Dept: MAMMOGRAPHY | Age: 70
Discharge: HOME | End: 2025-06-16
Payer: MEDICARE

## 2025-06-16 VITALS — BODY MASS INDEX: 30.73 KG/M2 | WEIGHT: 180 LBS | HEIGHT: 64 IN

## 2025-06-16 DIAGNOSIS — Z12.31 ENCOUNTER FOR SCREENING MAMMOGRAM FOR BREAST CANCER: ICD-10-CM

## 2025-06-16 PROCEDURE — 77063 BREAST TOMOSYNTHESIS BI: CPT

## 2025-06-24 ENCOUNTER — HOSPITAL ENCOUNTER (OUTPATIENT)
Dept: VASCULAR LAB | Age: 70
Discharge: HOME | End: 2025-06-26
Payer: MEDICARE

## 2025-06-24 DIAGNOSIS — I65.23 BILATERAL CAROTID ARTERY STENOSIS: ICD-10-CM

## 2025-06-24 LAB
VAS LEFT ARM BP DIA: 82 MMHG
VAS LEFT ARM BP: 128 MMHG
VAS LEFT CCA DIST EDV: 23.1 CM/S
VAS LEFT CCA DIST PSV: 63.8 CM/S
VAS LEFT CCA MID EDV: 21.7 CM/S
VAS LEFT CCA MID PSV: 75.7 CM/S
VAS LEFT CCA PROX EDV: 21.7 CM/S
VAS LEFT CCA PROX PSV: 80.6 CM/S
VAS LEFT ECA EDV: 13.3 CM/S
VAS LEFT ECA PSV: 88.3 CM/S
VAS LEFT ICA DIST EDV: 26 CM/S
VAS LEFT ICA DIST PSV: 89.2 CM/S
VAS LEFT ICA MID EDV: 30.3 CM/S
VAS LEFT ICA MID PSV: 107 CM/S
VAS LEFT ICA PROX EDV: 69.5 CM/S
VAS LEFT ICA PROX PSV: 162 CM/S
VAS LEFT ICA/CCA PSV: 2.14
VAS LEFT SUBCLAVIAN PROX EDV: 0 CM/S
VAS LEFT SUBCLAVIAN PROX PSV: 119 CM/S
VAS LEFT VERTEBRAL EDV: 16.9 CM/S
VAS LEFT VERTEBRAL PSV: 47.8 CM/S
VAS RIGHT ARM BP DIA: 79 MMHG
VAS RIGHT ARM BP: 130 MMHG
VAS RIGHT CCA DIST EDV: 20.5 CM/S
VAS RIGHT CCA DIST PSV: 69.6 CM/S
VAS RIGHT CCA MID EDV: 18.6 CM/S
VAS RIGHT CCA MID PSV: 69.6 CM/S
VAS RIGHT CCA PROX EDV: 16.2 CM/S
VAS RIGHT CCA PROX PSV: 55.3 CM/S
VAS RIGHT ECA EDV: 11.2 CM/S
VAS RIGHT ECA PSV: 85.1 CM/S
VAS RIGHT ICA DIST EDV: 21.5 CM/S
VAS RIGHT ICA DIST PSV: 55 CM/S
VAS RIGHT ICA MID EDV: 28.6 CM/S
VAS RIGHT ICA MID PSV: 76.4 CM/S
VAS RIGHT ICA PROX EDV: 14.3 CM/S
VAS RIGHT ICA PROX PSV: 50.3 CM/S
VAS RIGHT ICA/CCA PSV: 1.1
VAS RIGHT SUBCLAVIAN PROX EDV: 0 CM/S
VAS RIGHT SUBCLAVIAN PROX PSV: 106 CM/S
VAS RIGHT VERTEBRAL EDV: 8.5 CM/S
VAS RIGHT VERTEBRAL PSV: 29.5 CM/S

## 2025-06-24 PROCEDURE — 93880 EXTRACRANIAL BILAT STUDY: CPT

## 2025-06-24 PROCEDURE — 93880 EXTRACRANIAL BILAT STUDY: CPT | Performed by: SURGERY
